# Patient Record
Sex: FEMALE | Race: WHITE | NOT HISPANIC OR LATINO | Employment: FULL TIME
[De-identification: names, ages, dates, MRNs, and addresses within clinical notes are randomized per-mention and may not be internally consistent; named-entity substitution may affect disease eponyms.]

---

## 2020-01-16 ENCOUNTER — TRANSCRIBE ORDERS (OUTPATIENT)
Dept: OTHER | Age: 25
End: 2020-01-16

## 2020-01-16 DIAGNOSIS — E05.00 GRAVES DISEASE: Primary | ICD-10-CM

## 2020-03-03 ENCOUNTER — OFFICE VISIT (OUTPATIENT)
Dept: OPHTHALMOLOGY | Facility: CLINIC | Age: 25
End: 2020-03-03
Attending: OPHTHALMOLOGY
Payer: COMMERCIAL

## 2020-03-03 DIAGNOSIS — E05.00 GRAVES DISEASE: ICD-10-CM

## 2020-03-03 DIAGNOSIS — H02.539 EYELID RETRACTION OR LAG: ICD-10-CM

## 2020-03-03 DIAGNOSIS — H05.20 PROPTOSIS: Primary | ICD-10-CM

## 2020-03-03 DIAGNOSIS — H05.20 PROPTOSIS: ICD-10-CM

## 2020-03-03 PROCEDURE — G0463 HOSPITAL OUTPT CLINIC VISIT: HCPCS | Mod: 25,ZF

## 2020-03-03 PROCEDURE — 92285 EXTERNAL OCULAR PHOTOGRAPHY: CPT | Mod: ZF | Performed by: OPHTHALMOLOGY

## 2020-03-03 RX ORDER — METOPROLOL TARTRATE 50 MG
75 TABLET ORAL 2 TIMES DAILY
COMMUNITY
Start: 2020-01-06 | End: 2020-09-01

## 2020-03-03 ASSESSMENT — SLIT LAMP EXAM - LIDS
COMMENTS: UPPER LID RETRACTION
COMMENTS: NORMAL

## 2020-03-03 ASSESSMENT — MARGIN REFLEX DISTANCE
OD_MRD2: 5
OS_MRD2: 5
OD_MRD1: 6
OS_MRD1: 4

## 2020-03-03 ASSESSMENT — TONOMETRY
IOP_METHOD: ICARE
OD_IOP_MMHG: 17
OS_IOP_MMHG: 16

## 2020-03-03 ASSESSMENT — LAGOPHTHALMOS
OS_LAGOPHTHALMOS: 0
OD_LAGOPHTHALMOS: 0

## 2020-03-03 ASSESSMENT — VISUAL ACUITY
OS_SC+: -1
OS_SC: 20/20
METHOD: SNELLEN - LINEAR
OD_SC: 20/25

## 2020-03-03 ASSESSMENT — CUP TO DISC RATIO
OS_RATIO: 0.3
OD_RATIO: 0.25

## 2020-03-03 ASSESSMENT — EXTERNAL EXAM - LEFT EYE: OS_EXAM: NORMAL

## 2020-03-03 ASSESSMENT — CONF VISUAL FIELD
OD_NORMAL: 1
METHOD: COUNTING FINGERS
OS_NORMAL: 1

## 2020-03-03 ASSESSMENT — EXTERNAL EXAM - RIGHT EYE: OD_EXAM: UPPER LID RETRACTION

## 2020-03-03 NOTE — PROGRESS NOTES
HPI: Maia Woods is a 24 year old F who presents for initial thyroid eye disease evaluation. Referred by Dr. Jones of endocrinology. Started methimazole in December and has slowly increased dosages, now taking 5 mg 6x daily. No surgery or ARSHAD.    She was initially diagnosed with Graves disease in 11/2019 (tachycardic on Apple Watch, feeling warm). First noticed eye symptoms in 1/2020. Initially thought this was pinkeye related. Works in [employer] and stares at computer screen all day, which is new. Recently got blue light glasses to help with eye strain.     She is a never smoker. She is taking selenium 200 micrograms daily x1mo.    Referring physician: Dr. Jones (endocrinology)    Thyroid history:  Diagnosed when? 11/2019   ARSHAD: N/A  Thyroidectomy: N/A    TSI (date):12.5 (11/2019)      Previous results: N/A    Eye symptoms (since when):   Proptosis (better/worse/same since last visit): Yes - right eye  Diplopia(better/worse/same since last visit): NA  Eyelid retraction(better/worse/same since last visit): Yes - right upper eyelid   Tearing(better/worse/same since last visit): NA  Redness (better/worse/same since last visit): NA  Pain ((better/worse/same since last visit): NA  Pain to move the eyes (better/worse/same since last visit): NA  Blurred vision: Right eye seems to be a little blurrier    Ocular history:   Orbital decompression (date, details): N/A  Strabismus surgery (date, details): N/A  Eyelid surgery (date, details): N/A    Exam:   Kena (base):97     Better/worse same: 20/18 (initial)  Strabismus (better/worse/same): initial  Eyelid retraction (better/worse/same): initial      Maia Woods is a 24 year old female with the following diagnoses:     1. 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  The natural history of thyroid eye disease was discussed. We told the patient that typically LINDSEY will worsen for a  period of time, then improve to some degree, and then stabilize without normalizing.  This process can take somewhere between 1 and 3 years on average.  In the meantime, we recommended seeing the patient in the [facility] every 6 months (sooner if the patient experiences worsening vision in either eye).  Once the patient becomes stable for at least 6 months' time, we discussed that the patient may need restorative surgery or prisms.  Finally, we discussed that correcting the thyroid hormone levels does not ensure that the eyes will normalize but that it could help to some degree.      Maia Woods has mild thyroid eye disease including mild right proptosis and right upper eyelid retraction. She has no diplopia, her alignment is ortho, and she has no limitation of extraocular muscle movements. She has some dryness symptoms, and we discussed starting ATs as needed.     Her was TSI elevated to 12.5 on 11/21/2019. She is planning to have either ARSHAD or thyroidectomy in the near future, once the thyroid levels come into an acceptable range.    Return precautions were discussed. Recommend in thyroid eye disease clinic in 4 months with repeat TSI prior.      I spent a total of 50 minutes face to face with Maia Woods during today's office visit.  Over 50% of this time was spent counseling the patient and/or coordinating care regarding her thyroid eye disease.    Complete documentation of historical and exam elements from today's encounter can be found in the full encounter summary report (not reduplicated in this progress note).  I personally obtained the chief complaint(s) and history of present illness.  I confirmed and edited as necessary the review of systems, past medical/surgical history, family history, social history, and examination findings as documented by others; and I examined the patient myself.  I personally reviewed the relevant tests, images, and reports as documented above.  I  formulated and edited as necessary the assessment and plan and discussed the findings and management plan with the patient and family.  I personally reviewed the ophthalmic test(s) associated with this encounter, agree with the interpretation(s) as documented by the resident/fellow, and have edited the corresponding report(s) as necessary.     MD Meaghan Martinez MD  Oculoplastic Surgery Fellow

## 2020-03-03 NOTE — LETTER
2020    RE: Maia Woods  : 1995  MRN: [id number]    Dear Dr. Junior,    Thank you for referring your patient, Maia Woods, to our multi-disciplinary thyroid eye disease clinic recently.  After a thorough history followed by a neuro-ophthalmology, strabismus, and oculoplastics examination, we came to the following conclusions:     HPI: Maia Woods is a 24 year old F who presents for initial thyroid eye disease evaluation. Referred by Dr. Jones of endocrinology. Started methimazole in December and has slowly increased dosages, now taking 5 mg 6x daily. No surgery or ARSHAD.    She was initially diagnosed with Graves disease in 2019 (tachycardic on Apple Watch, feeling warm). First noticed eye symptoms in 2020. Initially thought this was pinkeye related. Works in [employer] and stares at computer screen all day, which is new. Recently got blue light glasses to help with eye strain.     She is a never smoker. She is taking selenium 200 micrograms daily x1mo.    Referring physician: Dr. Jones (endocrinology)    Thyroid history:  Diagnosed when? 2019   ARSHAD: N/A  Thyroidectomy: N/A    TSI (date):12.5 (2019)      Previous results: N/A    Eye symptoms (since when):   Proptosis (better/worse/same since last visit): Yes - right eye  Diplopia(better/worse/same since last visit): NA  Eyelid retraction(better/worse/same since last visit): Yes - right upper eyelid   Tearing(better/worse/same since last visit): NA  Redness (better/worse/same since last visit): NA  Pain ((better/worse/same since last visit): NA  Pain to move the eyes (better/worse/same since last visit): NA  Blurred vision: Right eye seems to be a little blurrier  Ocular history:   Orbital decompression (date, details): N/A  Strabismus surgery (date, details): N/A  Eyelid surgery (date, details): N/A    Exam:   Kena (base):97     Better/worse same: 20/18 (initial)  Strabismus (better/worse/same): initial  Eyelid retraction  (better/worse/same): initial      Maia Woods is a 24 year old female with the following diagnoses:     1. 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  The natural history of thyroid eye disease was discussed. We told the patient that typically LINDSEY will worsen for a period of time, then improve to some degree, and then stabilize without normalizing.  This process can take somewhere between 1 and 3 years on average.  In the meantime, we recommended seeing the patient in the [facility] every 6 months (sooner if the patient experiences worsening vision in either eye).  Once the patient becomes stable for at least 6 months' time, we discussed that the patient may need restorative surgery or prisms.  Finally, we discussed that correcting the thyroid hormone levels does not ensure that the eyes will normalize but that it could help to some degree.      Maia Woods has mild thyroid eye disease including mild right proptosis and right upper eyelid retraction. She has no diplopia, her alignment is ortho, and she has no limitation of extraocular muscle movements. She has some dryness symptoms, and we discussed starting ATs as needed.     Her was TSI elevated to 12.5 on 11/21/2019. She is planning to have either ARSHAD or thyroidectomy in the near future, once the thyroid levels come into an acceptable range.    Return precautions were discussed. Recommend in thyroid eye disease clinic in 4 months with repeat TSI prior.       Thank you for trusting us with the care of your patient.  For further exam details, please feel free to contact our office for additional records.  If you wish to contact us directly regarding this patient please email us or give our clinic a call to arrange a phone conversation.    Sincerely,    John Paul Zuñiga MD  , Neuro-Ophthalmology and Adult Strabismus  Department of Ophthalmology  and Visual Neurosciences  [email or web address]    Mia Hoskins MD    Oculoplastic and Orbital Surgery   Department of Ophthalmology and Visual Neurosciences  [facility]  [email or web address]

## 2020-03-03 NOTE — NURSING NOTE
"Chief Complaint(s) and History of Present Illness(es)     Thyroid Eye disease Eval               Comments     Referred by Dr. Jones for LINDSEY aiden. Started to notice that her eyes seem more \"heavy\" over the last few weeks, but endocrinologist noted it in January. Denies diplopia or blurred vision. Hasn't used drops. No pain or irritation. Started methimazole in December and has slowly increased dosages, now taking 5 mg 6x daily. Labs recently done 2/17/2020, not sure of results but told was elevated.   Inf: pt/mom                 "

## 2020-05-06 ENCOUNTER — TRANSFERRED RECORDS (OUTPATIENT)
Dept: SURGERY | Facility: CLINIC | Age: 25
End: 2020-05-06

## 2020-05-20 ENCOUNTER — VIRTUAL VISIT (OUTPATIENT)
Dept: SURGERY | Facility: CLINIC | Age: 25
End: 2020-05-20
Payer: COMMERCIAL

## 2020-05-20 ENCOUNTER — PREP FOR PROCEDURE (OUTPATIENT)
Dept: SURGERY | Facility: CLINIC | Age: 25
End: 2020-05-20

## 2020-05-20 VITALS — WEIGHT: 145 LBS | HEIGHT: 67 IN | BODY MASS INDEX: 22.76 KG/M2

## 2020-05-20 DIAGNOSIS — E05.00 GRAVES DISEASE: Primary | ICD-10-CM

## 2020-05-20 PROCEDURE — 99202 OFFICE O/P NEW SF 15 MIN: CPT | Mod: 95 | Performed by: SURGERY

## 2020-05-20 ASSESSMENT — MIFFLIN-ST. JEOR: SCORE: 1430.35

## 2020-05-20 NOTE — PROGRESS NOTES
History of Present Illness  Maia Woods is a 24 year old female who is referred from Dr. Kyra Jones for surgery consultation regarding  Graves Disease.  Maia recently diagnosed with Graves in November 2019.  She denies fatigue, weight changes, heat/cold intolerance, bowel/skin changes or CVS symptoms.  Only symptom is palpitations.  Also has significant eye symptoms.  Mostly bulging.    She reports no neck discomfort.  She denies swallowing difficulty, shortness of breath and hoarseness.    She does not have a family history of thyroid cancer.  She denies a personal history of radiation exposure.    Maia is on thyroid medications.  Thyroid medications include: Methimazole and Metoprolol .  Maia has no prior neck surgery..      Past Medical History:   Diagnosis Date     Hyperthyroidism      Past Surgical History:   Procedure Laterality Date     AS RAD RESEC TONSIL/PILLARS  2005       Smoking History:  has never smoked.    Review Of Systems:    10 point ROS is negative except as stated in the History of the Present Illness     Physical Exam:  There were no vitals taken for this visit.  Well developed, well nourished female in no apparent distress.  HEENT:  Normocephalic, atraumatic.                   Eyes with exophthalmos.  Neck:   Normal appearance, mild central fullness, symmetrical.              Range of motion is normal.              No obvious neck masses.  Respirations:  Unlabored.  Neurologic:  Alert.  Speech is clear, no hoarseness.  Psychologic:  Alert and appropriate range of emotions.    Imaging:  All imaging personally reviewed with Maia   Thyroid scan: 44% uptake    Labs:  Reviewed    Assessment and Plan:    Maia has Graves Disease.   I am recommending her for  total thyroidectomy.  Maia is aware of the risks to the recurrent laryngeal nerve and to the parathyroid glands during surgery.  She is interested in proceeding with surgery sometime in the next several weeks.    Mimi PRETTY  MD Isac  Please route to : Primary Care Provider (PCP) and Referring Provider  or send dictated letter to:  Referring Provider    23 minutes spent with the patient, over 50% as counseling.    Video-Visit Details     Type of service:  Video Visit     Video Start Time: 12:52 PM  Video End Time: 1:15 PM    Originating Location (pt. Location): Home     Distant Location (provider location):  [facility]      Platform used for Video Visit: Duncan Chau MD

## 2020-05-20 NOTE — PROGRESS NOTES
"Maia Woods is a 24 year old female who is being evaluated via a billable video visit.      The patient has been notified of following:     \"This video visit will be conducted via a call between you and your physician/provider. We have found that certain health care needs can be provided without the need for an in-person physical exam.  This service lets us provide the care you need with a video conversation.  If a prescription is necessary we can send it directly to your pharmacy.  If lab work is needed we can place an order for that and you can then stop by our lab to have the test done at a later time.    Video visits are billed at different rates depending on your insurance coverage.  Please reach out to your insurance provider with any questions.    If during the course of the call the physician/provider feels a video visit is not appropriate, you will not be charged for this service.\"    Patient has given verbal consent for Video visit? Yes      Patient would like the video invitation sent by: Send to e-mail at: [email or web address]    Will anyone else be joining your video visit? mother            "

## 2020-05-21 ENCOUNTER — TELEPHONE (OUTPATIENT)
Dept: SURGERY | Facility: CLINIC | Age: 25
End: 2020-05-21

## 2020-05-21 DIAGNOSIS — Z11.59 ENCOUNTER FOR SCREENING FOR OTHER VIRAL DISEASES: Primary | ICD-10-CM

## 2020-05-21 PROBLEM — E05.00 GRAVES DISEASE: Status: ACTIVE | Noted: 2020-05-21

## 2020-05-21 NOTE — TELEPHONE ENCOUNTER
Type of surgery:  TOTAL THYROIDECTOMY    Location of surgery: [facility]  Date and time of surgery: 6/22/2020 @ 9:40 AM   Surgeon: RAY NARAYANAN MD   Pre-Op Appt Date: PATIENT TO SCHEDULE    Post-Op Appt Date: PATIENT TO SCHEDULE     Packet sent out: Yes  Pre-cert/Authorization completed:  Not Applicable  Date: 5/21/2020       --OUTPATIENT OVERNIGHT-- TOTAL THYROIDECTOMY    GENERAL PT INST TO HAVE H&P WITH DR PHELAN 150 MIN REQ PA ASSIST NLG NMS

## 2020-05-27 RX ORDER — IODINE SOLUTION STRONG 5% (LUGOL'S) 5 %
SOLUTION ORAL
Qty: 15 ML | Refills: 0 | Status: ON HOLD | OUTPATIENT
Start: 2020-05-27 | End: 2020-06-23

## 2020-05-28 NOTE — PROGRESS NOTES
Service Date: 2020      May 20, 2020         Kyra Jones MD   [facility] [address]   [place] [address]      Re:  Maia Woods,  1995         Dear Kyra:      Thank you for asking me to see Maia Woods regarding her Graves' disease.  This pleasant 24-year-old female was seen via video consult with her mother for her recently diagnosed Graves' disease.  Her diagnosis was in 2019, based on a high apical pulse rate identified with her new Apple watch.  She at that time had no other symptoms.  Her only symptom currently is occasional palpitations.  She also has significant eye symptoms, which is mostly the appearance of her exophthalmos. She denies tearing, redness, irritation, dryness, double vision, etc.  She reports no neck discomfort, swallowing difficulty, shortness of breath, or hoarseness.  There is no family history of thyroid cancer, and no personal history of radiation exposure.  Currently, Maia is on methimazole and metoprolol.  She is requiring increasing doses as her Graves' disease is still not under adequate control.  There is a planned recheck of her labs values after her most recent dose increase and prior to surgery.  Maia has no prior neck surgery.      Her exam is remarkable for the presence of exophthalmos noted.  Her neck reveals an excellent range of motion, some mild fullness in the central neck.  No asymmetry or obvious masses or nodularity noted.  Her imaging is reviewed and simply is a thyroid scan with a 44% uptake.  She also has numerous lab values which were reviewed.      Maia has Graves' disease and has been a challenge for medical management.  She requires escalating doses of her medication and still has not obtained a euthyroid state.  She also has a complication of exophthalmos and is for that reason a poor candidate for radioactive iodine.  I would recommend her for total thyroidectomy.  She is aware of  the risks to the recurrent laryngeal nerve and parathyroid glands during surgery.  She is anxious to proceed sometime in the next several weeks.  We have her scheduled on , so that she can have her repeat lab work done prior to proceeding.  She is given a prescription for Lugol's solution, which I would like her to take for the 5 days prior to her surgery.      Thank you for the referral of this delightful patient.  I did offer to meet with her prior to surgery if that was something she desired, but she is comfortable with our video visit with proceeding and we will meet on the day of her surgery.      Thank you for the referral of this delightful young woman.            RAY NARAYANAN MD             D: 2020   T: 2020   MT: WYATT      Name:     PARESH COOPER   MRN:      [id number]        Account:      [id number]   :      1995           Service Date: 2020      Document: [id number]

## 2020-05-29 DIAGNOSIS — E05.00 GRAVES DISEASE: Primary | ICD-10-CM

## 2020-05-29 RX ORDER — IODINE SOLUTION STRONG 5% (LUGOL'S) 5 %
SOLUTION ORAL
Qty: 15 ML | Refills: 0 | Status: ON HOLD | OUTPATIENT
Start: 2020-05-29 | End: 2020-06-22

## 2020-06-02 ENCOUNTER — OFFICE VISIT (OUTPATIENT)
Dept: OPHTHALMOLOGY | Facility: CLINIC | Age: 25
End: 2020-06-02
Attending: OPHTHALMOLOGY
Payer: COMMERCIAL

## 2020-06-02 DIAGNOSIS — E05.00 PROPTOSIS DUE TO THYROID DISORDER: ICD-10-CM

## 2020-06-02 DIAGNOSIS — E05.00 GRAVES DISEASE: Primary | ICD-10-CM

## 2020-06-02 DIAGNOSIS — H02.539 EYELID RETRACTION OR LAG: ICD-10-CM

## 2020-06-02 PROCEDURE — G0463 HOSPITAL OUTPT CLINIC VISIT: HCPCS | Mod: ZF

## 2020-06-02 ASSESSMENT — TONOMETRY
IOP_METHOD: ICARE
OS_IOP_MMHG: 19
OS_IOP_MMHG: 20
OD_IOP_MMHG: 19
OD_IOP_MMHG: 21
IOP_METHOD: ICARE

## 2020-06-02 ASSESSMENT — SLIT LAMP EXAM - LIDS
COMMENTS: NORMAL
COMMENTS: UPPER LID RETRACTION

## 2020-06-02 ASSESSMENT — CUP TO DISC RATIO
OD_RATIO: 0.25
OS_RATIO: 0.3

## 2020-06-02 ASSESSMENT — VISUAL ACUITY
METHOD: SNELLEN - LINEAR
OD_SC: 20/25
OD_SC+: -3
OS_SC+: -1
OS_SC: 20/20

## 2020-06-02 ASSESSMENT — CONF VISUAL FIELD
METHOD: COUNTING FINGERS
OS_NORMAL: 1
OD_NORMAL: 1

## 2020-06-02 ASSESSMENT — EXTERNAL EXAM - LEFT EYE: OS_EXAM: NORMAL

## 2020-06-02 ASSESSMENT — EXTERNAL EXAM - RIGHT EYE: OD_EXAM: UPPER LID RETRACTION

## 2020-06-02 NOTE — PROGRESS NOTES
HPI:    Maia Woods is a 24 year old F who presents for follow up of thyroid eye disease. Stable subjectively since last visit. She is a never smoker. She is taking selenium 200 micrograms daily.     Referring physician: Dr. Jones (endocrinology)    Thyroid history:  Diagnosed when? 11/2019   ARSHAD: N/A  Thyroidectomy: N/A    TSI (date):12.5 (11/2019)      Previous results: N/A    Eye symptoms (since when):   Proptosis (better/worse/same since last visit): Same - right eye  Diplopia(better/worse/same since last visit): NA  Eyelid retraction(better/worse/same since last visit): Yes - right upper eyelid   Tearing(better/worse/same since last visit): NA  Redness (better/worse/same since last visit): NA  Pain ((better/worse/same since last visit): NA  Pain to move the eyes (better/worse/same since last visit): NA  Blurred vision: Right eye seems to be a little blurrier    Ocular history:   Orbital decompression (date, details): N/A  Strabismus surgery (date, details): N/A  Eyelid surgery (date, details): N/A    Exam:   Kena (base):97   Better/worse same: 20/19 Same (20/18)  Strabismus (better/worse/same): initial  Eyelid retraction (better/worse/same): initial    Maia Woods is a 24 year old female with the following diagnoses:   1. Graves disease       Maia Woods has mild thyroid eye disease including mild right proptosis and right upper eyelid retraction. She has no diplopia, her alignment is ortho, and she has no limitation of extraocular muscle movements. She has some dryness symptoms, and we discussed starting ATs as needed.     She is planning to have thyroidectomy 6/22/2020.    Return precautions were discussed. Recommend in thyroid eye disease clinic in 4 months with repeat TSI prior.      Discussed with patient that we could consider Tepezza IF thyroid eye disease worsens.       Complete documentation of historical and exam elements from today's encounter can be found in the full encounter  summary report (not reduplicated in this progress note).  I personally obtained the chief complaint(s) and history of present illness.  I confirmed and edited as necessary the review of systems, past medical/surgical history, family history, social history, and examination findings as documented by others; and I examined the patient myself.  I personally reviewed the relevant tests, images, and reports as documented above.  I formulated and edited as necessary the assessment and plan and discussed the findings and management plan with the patient and family.  I personally reviewed the ophthalmic test(s) associated with this encounter, agree with the interpretation(s) as documented by the resident/fellow, and have edited the corresponding report(s) as necessary.     MD Jayy Martinez MD  Ophthalmology, PGY-5  Neuro-Ophthalmology Fellow

## 2020-06-17 RX ORDER — ACETAMINOPHEN 500 MG
500-1000 TABLET ORAL EVERY 6 HOURS PRN
COMMUNITY

## 2020-06-19 DIAGNOSIS — Z11.59 ENCOUNTER FOR SCREENING FOR OTHER VIRAL DISEASES: ICD-10-CM

## 2020-06-19 PROCEDURE — 99000 SPECIMEN HANDLING OFFICE-LAB: CPT | Performed by: SURGERY

## 2020-06-19 PROCEDURE — U0003 INFECTIOUS AGENT DETECTION BY NUCLEIC ACID (DNA OR RNA); SEVERE ACUTE RESPIRATORY SYNDROME CORONAVIRUS 2 (SARS-COV-2) (CORONAVIRUS DISEASE [COVID-19]), AMPLIFIED PROBE TECHNIQUE, MAKING USE OF HIGH THROUGHPUT TECHNOLOGIES AS DESCRIBED BY CMS-2020-01-R: HCPCS | Mod: 90 | Performed by: SURGERY

## 2020-06-20 LAB
SARS-COV-2 RNA SPEC QL NAA+PROBE: NOT DETECTED
SPECIMEN SOURCE: NORMAL

## 2020-06-22 ENCOUNTER — ANESTHESIA (OUTPATIENT)
Dept: SURGERY | Facility: CLINIC | Age: 25
End: 2020-06-22
Payer: COMMERCIAL

## 2020-06-22 ENCOUNTER — HOSPITAL ENCOUNTER (OUTPATIENT)
Facility: CLINIC | Age: 25
Discharge: HOME | End: 2020-06-23
Attending: SURGERY | Admitting: SURGERY
Payer: COMMERCIAL

## 2020-06-22 ENCOUNTER — ANESTHESIA EVENT (OUTPATIENT)
Dept: SURGERY | Facility: CLINIC | Age: 25
End: 2020-06-22
Payer: COMMERCIAL

## 2020-06-22 ENCOUNTER — SURGERY (OUTPATIENT)
Age: 25
End: 2020-06-22
Payer: COMMERCIAL

## 2020-06-22 ENCOUNTER — OFFICE VISIT (OUTPATIENT)
Dept: SURGERY | Facility: PHYSICIAN GROUP | Age: 25
End: 2020-06-22
Payer: COMMERCIAL

## 2020-06-22 DIAGNOSIS — Z98.890 S/P THYROID SURGERY: Primary | ICD-10-CM

## 2020-06-22 DIAGNOSIS — E05.00 GRAVES DISEASE: ICD-10-CM

## 2020-06-22 DIAGNOSIS — Z53.9 ERRONEOUS ENCOUNTER--DISREGARD: Primary | ICD-10-CM

## 2020-06-22 LAB
CALCIUM SERPL-MCNC: 8.3 MG/DL (ref 8.5–10.1)
HCG UR QL: NEGATIVE

## 2020-06-22 PROCEDURE — 40000306 ZZH STATISTIC PRE PROC ASSESS II: Performed by: SURGERY

## 2020-06-22 PROCEDURE — 25000132 ZZH RX MED GY IP 250 OP 250 PS 637: Performed by: SURGERY

## 2020-06-22 PROCEDURE — 60240 REMOVAL OF THYROID: CPT | Performed by: SURGERY

## 2020-06-22 PROCEDURE — 37000008 ZZH ANESTHESIA TECHNICAL FEE, 1ST 30 MIN: Performed by: SURGERY

## 2020-06-22 PROCEDURE — 25000125 ZZHC RX 250: Performed by: SURGERY

## 2020-06-22 PROCEDURE — 36415 COLL VENOUS BLD VENIPUNCTURE: CPT | Performed by: SURGERY

## 2020-06-22 PROCEDURE — 36000093 ZZH SURGERY LEVEL 4 1ST 30 MIN: Performed by: SURGERY

## 2020-06-22 PROCEDURE — 60240 REMOVAL OF THYROID: CPT | Mod: AS | Performed by: PHYSICIAN ASSISTANT

## 2020-06-22 PROCEDURE — 25000128 H RX IP 250 OP 636: Performed by: PHYSICIAN ASSISTANT

## 2020-06-22 PROCEDURE — 88307 TISSUE EXAM BY PATHOLOGIST: CPT | Performed by: SURGERY

## 2020-06-22 PROCEDURE — 88307 TISSUE EXAM BY PATHOLOGIST: CPT | Mod: 26 | Performed by: SURGERY

## 2020-06-22 PROCEDURE — 82310 ASSAY OF CALCIUM: CPT | Performed by: SURGERY

## 2020-06-22 PROCEDURE — 37000009 ZZH ANESTHESIA TECHNICAL FEE, EACH ADDTL 15 MIN: Performed by: SURGERY

## 2020-06-22 PROCEDURE — 25000128 H RX IP 250 OP 636: Performed by: ANESTHESIOLOGY

## 2020-06-22 PROCEDURE — 71000012 ZZH RECOVERY PHASE 1 LEVEL 1 FIRST HR: Performed by: SURGERY

## 2020-06-22 PROCEDURE — 25800030 ZZH RX IP 258 OP 636: Performed by: ANESTHESIOLOGY

## 2020-06-22 PROCEDURE — 25000125 ZZHC RX 250: Performed by: NURSE ANESTHETIST, CERTIFIED REGISTERED

## 2020-06-22 PROCEDURE — 27210794 ZZH OR GENERAL SUPPLY STERILE: Performed by: SURGERY

## 2020-06-22 PROCEDURE — 25000128 H RX IP 250 OP 636: Performed by: SURGERY

## 2020-06-22 PROCEDURE — 36000063 ZZH SURGERY LEVEL 4 EA 15 ADDTL MIN: Performed by: SURGERY

## 2020-06-22 PROCEDURE — 81025 URINE PREGNANCY TEST: CPT | Performed by: ANESTHESIOLOGY

## 2020-06-22 PROCEDURE — 25000128 H RX IP 250 OP 636: Performed by: NURSE ANESTHETIST, CERTIFIED REGISTERED

## 2020-06-22 RX ORDER — ONDANSETRON 4 MG/1
4 TABLET, ORALLY DISINTEGRATING ORAL EVERY 30 MIN PRN
Status: DISCONTINUED | OUTPATIENT
Start: 2020-06-22 | End: 2020-06-22 | Stop reason: HOSPADM

## 2020-06-22 RX ORDER — HYDROMORPHONE HYDROCHLORIDE 1 MG/ML
0.2 INJECTION, SOLUTION INTRAMUSCULAR; INTRAVENOUS; SUBCUTANEOUS
Status: DISCONTINUED | OUTPATIENT
Start: 2020-06-22 | End: 2020-06-23 | Stop reason: HOSPADM

## 2020-06-22 RX ORDER — CEFAZOLIN SODIUM 2 G/100ML
2 INJECTION, SOLUTION INTRAVENOUS
Status: COMPLETED | OUTPATIENT
Start: 2020-06-22 | End: 2020-06-22

## 2020-06-22 RX ORDER — ONDANSETRON 2 MG/ML
4 INJECTION INTRAMUSCULAR; INTRAVENOUS EVERY 30 MIN PRN
Status: DISCONTINUED | OUTPATIENT
Start: 2020-06-22 | End: 2020-06-22 | Stop reason: HOSPADM

## 2020-06-22 RX ORDER — FENTANYL CITRATE 50 UG/ML
INJECTION, SOLUTION INTRAMUSCULAR; INTRAVENOUS PRN
Status: DISCONTINUED | OUTPATIENT
Start: 2020-06-22 | End: 2020-06-22

## 2020-06-22 RX ORDER — SODIUM CHLORIDE, SODIUM LACTATE, POTASSIUM CHLORIDE, CALCIUM CHLORIDE 600; 310; 30; 20 MG/100ML; MG/100ML; MG/100ML; MG/100ML
INJECTION, SOLUTION INTRAVENOUS CONTINUOUS
Status: DISCONTINUED | OUTPATIENT
Start: 2020-06-22 | End: 2020-06-22 | Stop reason: HOSPADM

## 2020-06-22 RX ORDER — NALOXONE HYDROCHLORIDE 0.4 MG/ML
.1-.4 INJECTION, SOLUTION INTRAMUSCULAR; INTRAVENOUS; SUBCUTANEOUS
Status: DISCONTINUED | OUTPATIENT
Start: 2020-06-22 | End: 2020-06-23 | Stop reason: HOSPADM

## 2020-06-22 RX ORDER — DEXAMETHASONE SODIUM PHOSPHATE 4 MG/ML
INJECTION, SOLUTION INTRA-ARTICULAR; INTRALESIONAL; INTRAMUSCULAR; INTRAVENOUS; SOFT TISSUE PRN
Status: DISCONTINUED | OUTPATIENT
Start: 2020-06-22 | End: 2020-06-22

## 2020-06-22 RX ORDER — PROPOFOL 10 MG/ML
INJECTION, EMULSION INTRAVENOUS PRN
Status: DISCONTINUED | OUTPATIENT
Start: 2020-06-22 | End: 2020-06-22

## 2020-06-22 RX ORDER — ACETAMINOPHEN 325 MG/1
650 TABLET ORAL EVERY 6 HOURS PRN
Status: DISCONTINUED | OUTPATIENT
Start: 2020-06-22 | End: 2020-06-23 | Stop reason: HOSPADM

## 2020-06-22 RX ORDER — NORGESTIMATE AND ETHINYL ESTRADIOL 7DAYSX3 LO
1 KIT ORAL DAILY
Status: DISCONTINUED | OUTPATIENT
Start: 2020-06-22 | End: 2020-06-23 | Stop reason: HOSPADM

## 2020-06-22 RX ORDER — DROSPIRENONE AND ETHINYL ESTRADIOL 0.02-3(28)
1 KIT ORAL DAILY
COMMUNITY
End: 2020-12-15

## 2020-06-22 RX ORDER — LABETALOL 20 MG/4 ML (5 MG/ML) INTRAVENOUS SYRINGE
10
Status: DISCONTINUED | OUTPATIENT
Start: 2020-06-22 | End: 2020-06-22 | Stop reason: HOSPADM

## 2020-06-22 RX ORDER — FENTANYL CITRATE 50 UG/ML
25-50 INJECTION, SOLUTION INTRAMUSCULAR; INTRAVENOUS
Status: DISCONTINUED | OUTPATIENT
Start: 2020-06-22 | End: 2020-06-22 | Stop reason: HOSPADM

## 2020-06-22 RX ORDER — MAGNESIUM HYDROXIDE 1200 MG/15ML
LIQUID ORAL PRN
Status: DISCONTINUED | OUTPATIENT
Start: 2020-06-22 | End: 2020-06-22 | Stop reason: HOSPADM

## 2020-06-22 RX ORDER — HYDROCODONE BITARTRATE AND ACETAMINOPHEN 5; 325 MG/1; MG/1
1-2 TABLET ORAL EVERY 4 HOURS PRN
Status: DISCONTINUED | OUTPATIENT
Start: 2020-06-22 | End: 2020-06-23 | Stop reason: HOSPADM

## 2020-06-22 RX ORDER — GLYCOPYRROLATE 0.2 MG/ML
INJECTION, SOLUTION INTRAMUSCULAR; INTRAVENOUS PRN
Status: DISCONTINUED | OUTPATIENT
Start: 2020-06-22 | End: 2020-06-22

## 2020-06-22 RX ORDER — ONDANSETRON 4 MG/1
4 TABLET, ORALLY DISINTEGRATING ORAL EVERY 6 HOURS PRN
Status: DISCONTINUED | OUTPATIENT
Start: 2020-06-22 | End: 2020-06-23 | Stop reason: HOSPADM

## 2020-06-22 RX ORDER — CEFAZOLIN SODIUM 1 G/3ML
1 INJECTION, POWDER, FOR SOLUTION INTRAMUSCULAR; INTRAVENOUS SEE ADMIN INSTRUCTIONS
Status: DISCONTINUED | OUTPATIENT
Start: 2020-06-22 | End: 2020-06-22 | Stop reason: HOSPADM

## 2020-06-22 RX ORDER — LIDOCAINE 40 MG/G
CREAM TOPICAL
Status: DISCONTINUED | OUTPATIENT
Start: 2020-06-22 | End: 2020-06-23 | Stop reason: HOSPADM

## 2020-06-22 RX ORDER — LIDOCAINE 40 MG/G
CREAM TOPICAL
Status: DISCONTINUED | OUTPATIENT
Start: 2020-06-22 | End: 2020-06-22 | Stop reason: HOSPADM

## 2020-06-22 RX ORDER — LIDOCAINE HYDROCHLORIDE 10 MG/ML
INJECTION, SOLUTION INFILTRATION; PERINEURAL PRN
Status: DISCONTINUED | OUTPATIENT
Start: 2020-06-22 | End: 2020-06-22

## 2020-06-22 RX ORDER — NEOSTIGMINE METHYLSULFATE 1 MG/ML
VIAL (ML) INJECTION PRN
Status: DISCONTINUED | OUTPATIENT
Start: 2020-06-22 | End: 2020-06-22

## 2020-06-22 RX ORDER — NALOXONE HYDROCHLORIDE 0.4 MG/ML
.1-.4 INJECTION, SOLUTION INTRAMUSCULAR; INTRAVENOUS; SUBCUTANEOUS
Status: DISCONTINUED | OUTPATIENT
Start: 2020-06-22 | End: 2020-06-22

## 2020-06-22 RX ORDER — BUPIVACAINE HYDROCHLORIDE 2.5 MG/ML
INJECTION, SOLUTION EPIDURAL; INFILTRATION; INTRACAUDAL PRN
Status: DISCONTINUED | OUTPATIENT
Start: 2020-06-22 | End: 2020-06-22 | Stop reason: HOSPADM

## 2020-06-22 RX ORDER — PROCHLORPERAZINE MALEATE 5 MG
10 TABLET ORAL EVERY 6 HOURS PRN
Status: DISCONTINUED | OUTPATIENT
Start: 2020-06-22 | End: 2020-06-23 | Stop reason: HOSPADM

## 2020-06-22 RX ORDER — ONDANSETRON 2 MG/ML
4 INJECTION INTRAMUSCULAR; INTRAVENOUS EVERY 6 HOURS PRN
Status: DISCONTINUED | OUTPATIENT
Start: 2020-06-22 | End: 2020-06-23 | Stop reason: HOSPADM

## 2020-06-22 RX ORDER — CALCIUM CARBONATE 1250 MG/5ML
2500 SUSPENSION ORAL EVERY 8 HOURS
Status: DISCONTINUED | OUTPATIENT
Start: 2020-06-22 | End: 2020-06-23 | Stop reason: HOSPADM

## 2020-06-22 RX ORDER — SODIUM CHLORIDE, SODIUM LACTATE, POTASSIUM CHLORIDE, CALCIUM CHLORIDE 600; 310; 30; 20 MG/100ML; MG/100ML; MG/100ML; MG/100ML
INJECTION, SOLUTION INTRAVENOUS CONTINUOUS
Status: DISCONTINUED | OUTPATIENT
Start: 2020-06-22 | End: 2020-06-23 | Stop reason: HOSPADM

## 2020-06-22 RX ORDER — LEVOTHYROXINE SODIUM 112 UG/1
112 TABLET ORAL DAILY
Status: DISCONTINUED | OUTPATIENT
Start: 2020-06-23 | End: 2020-06-23 | Stop reason: HOSPADM

## 2020-06-22 RX ORDER — ONDANSETRON 2 MG/ML
INJECTION INTRAMUSCULAR; INTRAVENOUS PRN
Status: DISCONTINUED | OUTPATIENT
Start: 2020-06-22 | End: 2020-06-22

## 2020-06-22 RX ADMIN — HYDROMORPHONE HYDROCHLORIDE 0.5 MG: 1 INJECTION, SOLUTION INTRAMUSCULAR; INTRAVENOUS; SUBCUTANEOUS at 11:31

## 2020-06-22 RX ADMIN — PROPOFOL 150 MG: 10 INJECTION, EMULSION INTRAVENOUS at 10:23

## 2020-06-22 RX ADMIN — ROCURONIUM BROMIDE 25 MG: 10 INJECTION INTRAVENOUS at 10:23

## 2020-06-22 RX ADMIN — CALCIUM CARBONATE 2500 MG: 1250 SUSPENSION ORAL at 22:07

## 2020-06-22 RX ADMIN — HYDROCODONE BITARTRATE AND ACETAMINOPHEN 2 TABLET: 5; 325 TABLET ORAL at 16:42

## 2020-06-22 RX ADMIN — CALCIUM CARBONATE 2500 MG: 1250 SUSPENSION ORAL at 15:44

## 2020-06-22 RX ADMIN — FENTANYL CITRATE 150 MCG: 50 INJECTION, SOLUTION INTRAMUSCULAR; INTRAVENOUS at 10:23

## 2020-06-22 RX ADMIN — GLYCOPYRROLATE 0.4 MG: 0.2 INJECTION, SOLUTION INTRAMUSCULAR; INTRAVENOUS at 11:55

## 2020-06-22 RX ADMIN — GLYCOPYRROLATE 0.2 MG: 0.2 INJECTION, SOLUTION INTRAMUSCULAR; INTRAVENOUS at 10:23

## 2020-06-22 RX ADMIN — BUPIVACAINE HYDROCHLORIDE 10 ML: 2.5 INJECTION, SOLUTION EPIDURAL; INFILTRATION; INTRACAUDAL; PERINEURAL at 11:46

## 2020-06-22 RX ADMIN — NORGESTIMATE AND ETHINYL ESTRADIOL 1 TABLET: KIT at 15:44

## 2020-06-22 RX ADMIN — HYDROCODONE BITARTRATE AND ACETAMINOPHEN 200 ML: 500; 5 TABLET ORAL at 11:54

## 2020-06-22 RX ADMIN — ONDANSETRON HYDROCHLORIDE 4 MG: 2 INJECTION, SOLUTION INTRAVENOUS at 11:55

## 2020-06-22 RX ADMIN — FENTANYL CITRATE 100 MCG: 50 INJECTION, SOLUTION INTRAMUSCULAR; INTRAVENOUS at 10:39

## 2020-06-22 RX ADMIN — DEXAMETHASONE SODIUM PHOSPHATE 4 MG: 4 INJECTION, SOLUTION INTRA-ARTICULAR; INTRALESIONAL; INTRAMUSCULAR; INTRAVENOUS; SOFT TISSUE at 10:23

## 2020-06-22 RX ADMIN — SODIUM CHLORIDE, POTASSIUM CHLORIDE, SODIUM LACTATE AND CALCIUM CHLORIDE: 600; 310; 30; 20 INJECTION, SOLUTION INTRAVENOUS at 09:00

## 2020-06-22 RX ADMIN — ONDANSETRON 4 MG: 4 TABLET, ORALLY DISINTEGRATING ORAL at 15:56

## 2020-06-22 RX ADMIN — CEFAZOLIN SODIUM 2 G: 2 INJECTION, SOLUTION INTRAVENOUS at 10:19

## 2020-06-22 RX ADMIN — MIDAZOLAM 2 MG: 1 INJECTION INTRAMUSCULAR; INTRAVENOUS at 10:19

## 2020-06-22 RX ADMIN — PROCHLORPERAZINE EDISYLATE 10 MG: 5 INJECTION INTRAMUSCULAR; INTRAVENOUS at 16:49

## 2020-06-22 RX ADMIN — HYDROMORPHONE HYDROCHLORIDE 0.5 MG: 1 INJECTION, SOLUTION INTRAMUSCULAR; INTRAVENOUS; SUBCUTANEOUS at 13:07

## 2020-06-22 RX ADMIN — LIDOCAINE HYDROCHLORIDE 50 MG: 10 INJECTION, SOLUTION INFILTRATION; PERINEURAL at 10:23

## 2020-06-22 RX ADMIN — HYDROMORPHONE HYDROCHLORIDE 0.5 MG: 1 INJECTION, SOLUTION INTRAMUSCULAR; INTRAVENOUS; SUBCUTANEOUS at 10:53

## 2020-06-22 RX ADMIN — Medication 2 MG: at 11:55

## 2020-06-22 RX ADMIN — SODIUM CHLORIDE, POTASSIUM CHLORIDE, SODIUM LACTATE AND CALCIUM CHLORIDE: 600; 310; 30; 20 INJECTION, SOLUTION INTRAVENOUS at 11:15

## 2020-06-22 ASSESSMENT — MIFFLIN-ST. JEOR
SCORE: 1471.17
SCORE: 1471.17

## 2020-06-22 ASSESSMENT — ENCOUNTER SYMPTOMS: DYSRHYTHMIAS: 0

## 2020-06-22 ASSESSMENT — LIFESTYLE VARIABLES: TOBACCO_USE: 0

## 2020-06-22 NOTE — PROGRESS NOTES
"Pt arrived to unit. Stand and pivot from cart to bed-some nausea with movement, relieved with rest. Pain 2/10- ice applied. ALEXANDRA drain to suction-per report stripped in PACU prior to coming up to unit. Dressing c/d/i. Chávez removed in PACU prior to coming to unit-due to void. On 2L O2- sats 95%. Security called per patient's request to bring up her phone. /81 (BP Location: Left arm)   Pulse 65   Temp 98  F (36.7  C) (Oral)   Resp 14   Ht 1.702 m (5' 7\")   Wt 69.9 kg (154 lb)   LMP 06/02/2020   SpO2 95%   BMI 24.12 kg/m    "

## 2020-06-22 NOTE — PLAN OF CARE
"PRIMARY DIAGNOSIS:  TOTAL THYROIDECTOMY  OUTPATIENT/OBSERVATION GOALS TO BE MET BEFORE DISCHARGE:  1. Stable vital signs Yes  2. Tolerating diet: no, intermittent nausea at this time  3. Pain controlled with oral pain medications:  Yes  4. Positive bowel sounds:  Yes  5. Voiding without difficulty:  due to pass first void after discontinue of foster catheter  6. Able to ambulate:  Yes  7. Provider specific discharge goals met:  No    Discharge Planner Nurse   Safe discharge environment identified: Yes  Barriers to discharge: Yes       Entered by: Karen M. Lesch 06/22/2020 5:36 PM, unless medically cleared    Blood pressure 131/77, pulse 65, temperature 98.3  F (36.8  C), temperature source Oral, resp. rate 15, height 1.702 m (5' 7\"), weight 69.9 kg (154 lb), last menstrual period 06/02/2020, SpO2 96 %.    VSS.  LS clear, adequate sats on 1 liter nasal cannula.  ETCO2  36, IPI  8-10.  Patient c/o of intermittent nausea, with little relief from Zofran.  Compazine IV  Given with later improvement.  Maintenance IV of LR continues at 100 ccs/hour.  Anterior neck dressing dry, intact.  ALEXANDRA draining small amounts of bloody liquid. Ice pack placed for increased comfort.  Norco given for discomfort.  Patient noted later to be resting comfortably.   Patient is due to pass first void since removal of foster catheter.  Plan:  Continue to provide supportive cares.  Pain control.       Please review provider order for any additional goals.   Nurse to notify provider when observation goals have been met and patient is ready for discharge.  "

## 2020-06-22 NOTE — ANESTHESIA POSTPROCEDURE EVALUATION
Patient: Maia Woods    Procedure(s):  TOTAL THYROIDECTOMY    Diagnosis:Graves disease [E05.00]  Diagnosis Additional Information: No value filed.    Anesthesia Type:  General    Note:  Anesthesia Post Evaluation    Patient location during evaluation: PACU  Patient participation: Able to fully participate in evaluation  Level of consciousness: awake  Pain management: adequate  Airway patency: patent  Cardiovascular status: acceptable  Respiratory status: acceptable  Hydration status: acceptable  PONV: none             Last vitals:  Vitals:    06/22/20 1349 06/22/20 1507 06/22/20 1529   BP: 134/81  131/77   Pulse:      Resp: 14 12 15   Temp: 98  F (36.7  C)  98.3  F (36.8  C)   SpO2: 95% 96% 96%         Electronically Signed By: Yrn Dean MD  June 22, 2020  5:51 PM

## 2020-06-22 NOTE — PHARMACY-ADMISSION MEDICATION HISTORY
Medication reconciliation interview completed by pre-admitting nurse Vilma Shukla, reviewed by pharmacy. Changed oral contraceptive formulation based on dispense records. Removed duplicate Lugol's solution.  No further clarifications needed.       Prior to Admission medications    Medication Sig Last Dose Taking? Auth Provider   acetaminophen (TYLENOL) 500 MG tablet Take 500-1,000 mg by mouth every 6 hours as needed for mild pain Past Week at Unknown time Yes Reported, Patient   Aspirin-Acetaminophen-Caffeine (EXCEDRIN PO) Take 1 tablet by mouth daily as needed  Past Month at Unknown time Yes Reported, Patient   drospirenone-ethinyl estradiol (JOSUE) 3-0.02 MG tablet Take 1 tablet by mouth daily 6/21/2020 at Unknown time Yes Unknown, Entered By History   METHIMAZOLE PO 40 mg once daily 6/21/2020 at Unknown time Yes Reported, Patient   metoprolol tartrate (LOPRESSOR) 50 MG tablet Take 75 mg by mouth 2 times daily  6/22/2020 at Unknown time Yes Reported, Patient   selenium 50 MCG TABS tablet Take 50 mcg by mouth daily 6/21/2020 at Unknown time Yes Reported, Patient   strong iodine (LUGOL'S) 5 % solution Take 5 drops by mouth 3 times a day for 5 days before surgery 6/21/2020 at Unknown time Yes Mimi Chau MD

## 2020-06-22 NOTE — ANESTHESIA PREPROCEDURE EVALUATION
Anesthesia Pre-Procedure Evaluation    Patient: Maia Woods   MRN: [id number] : 1995          Preoperative Diagnosis: Graves disease [E05.00]    Procedure(s):  TOTAL THYROIDECTOMY    Past Medical History:   Diagnosis Date     Hyperthyroidism     Grave's disease     IBS (irritable bowel syndrome)      Migraine      Past Surgical History:   Procedure Laterality Date     AS RAD RESEC TONSIL/PILLARS       GYN SURGERY      hymenectomy     wisdom teeth extraction       Anesthesia Evaluation     .             ROS/MED HX    ENT/Pulmonary:      (-) tobacco use, asthma and sleep apnea   Neurologic:  - neg neurologic ROS     Cardiovascular:        (-) hypertension, CAD, CHF, arrhythmias, pulmonary hypertension and dyslipidemia   METS/Exercise Tolerance:     Hematologic:        (-) anemia   Musculoskeletal:        (-) arthritis   GI/Hepatic:     (+) Inflammatory bowel disease,      (-) GERD and other GI/Hepatic   Renal/Genitourinary:      (-) renal disease   Endo:     (+) thyroid problem  hyperthyroidism, .      Psychiatric:  - neg psychiatric ROS       Infectious Disease:  - neg infectious disease ROS       Malignancy:      - no malignancy   Other:    - neg other ROS                      Physical Exam      Airway   Mallampati: II  TM distance: >3 FB  Neck ROM: full    Dental     Cardiovascular   Rhythm and rate: regular and normal  (-) no murmur    Pulmonary    breath sounds clear to auscultation    Other findings: No lab results found.   No lab results found.        No results found for: WBC, HGB, HCT, PLT, CRP, SED, NA, POTASSIUM, CHLORIDE, CO2, BUN, CR, GLC, IRLANDA, PHOS, MAG, ALBUMIN, PROTTOTAL, ALT, AST, GGT, ALKPHOS, BILITOTAL, BILIDIRECT, LIPASE, AMYLASE, HA, PTT, INR, FIBR, TSH, T4, T3, HCG, HCGS, CKTOTAL, CKMB, TROPN    Preop Vitals  BP Readings from Last 3 Encounters:   No data found for BP    Pulse Readings from Last 3 Encounters:   No data found for Pulse      Resp Readings from Last 3 Encounters:  "  No data found for Resp    SpO2 Readings from Last 3 Encounters:   No data found for SpO2      Temp Readings from Last 1 Encounters:   No data found for Temp    Ht Readings from Last 1 Encounters:   06/22/20 1.702 m (5' 7\")      Wt Readings from Last 1 Encounters:   06/22/20 69.9 kg (154 lb)    Estimated body mass index is 24.12 kg/m  as calculated from the following:    Height as of this encounter: 1.702 m (5' 7\").    Weight as of this encounter: 69.9 kg (154 lb).       Anesthesia Plan      History & Physical Review  History and physical reviewed and following examination; no interval change.    ASA Status:  2 .    NPO Status:  > 8 hours    Plan for General with Propofol induction. Maintenance will be Balanced.    PONV prophylaxis:  Ondansetron (or other 5HT-3)  Additional equipment: Videolaryngoscope        Postoperative Care  Postoperative pain management:  IV analgesics and Oral pain medications.      Consents  Anesthetic plan, risks, benefits and alternatives discussed with:  Patient..                 Abilio Padilla MD                    .  "

## 2020-06-22 NOTE — ANESTHESIA CARE TRANSFER NOTE
Patient: Maia Woods    Procedure(s):  TOTAL THYROIDECTOMY    Diagnosis: Graves disease [E05.00]  Diagnosis Additional Information: No value filed.    Anesthesia Type:   General     Note:  Airway :Face Mask  Patient transferred to:PACU  Handoff Report: Identifed the Patient, Identified the Reponsible Provider, Reviewed the pertinent medical history, Discussed the surgical course, Reviewed Intra-OP anesthesia mangement and issues during anesthesia, Set expectations for post-procedure period and Allowed opportunity for questions and acknowledgement of understanding      Vitals: (Last set prior to Anesthesia Care Transfer)    CRNA VITALS  6/22/2020 1146 - 6/22/2020 1224      6/22/2020             Pulse:  94    SpO2:  99 %    Resp Rate (observed):  15                Electronically Signed By: JORGE Avina CRNA  June 22, 2020  12:24 PM

## 2020-06-22 NOTE — OP NOTE
General Surgery Operative Note    PREOPERATIVE DIAGNOSIS:  Graves disease [E05.00]    POSTOPERATIVE DIAGNOSIS:  Graves disease    PROCEDURE:  Total thyroidectomy    ANESTHESIA:  General.    PREOPERATIVE MEDICATIONS:  Ancef 2 gm    SURGEON:  Mimi Chau MD    ASSISTANT:  Chanel Cabrera PA-C   - the physician assistant was medically necessary in providing adequate exposure in the operating field, maintaining hemostasis, cutting suture, clamping and ligating blood vessels, and visualization of the anatomic structures throughout the surgical procedure.    ESTIMATED BLOOD LOSS:  25cc's    INDICATIONS:  Maia Woods is a 24 year old female who has Graves disease.  She has also had symptoms including palpitations and eye symptoms.  She presents for total thyroidectomy.    DESCRIPTION OF PROCEDURE:  The patient was placed supine, head and neck in extension and a bump between the scapulae.  Transverse cervical neck creases were marked in the preinduction area and the one most suitable was utilized for exposure.  Superior and inferior skin flaps raised.  Midline fascia opened and reflected to the left. The thyroid lobe was firm and modestly enlarged.  The upper pole was taken down by double ligation and division.  Middle thyroidal vein and inferior pole veins were ligated and the gland reflected medially.  The superior parathyroid and recurrent laryngeal nerve were identified and preserved.  The posterior dissection was meticulously done to ligate and divide the inferior thyroidal artery and the ligament of Berry.  The inferior parathyroid was not clearly seen but did not appear to be in the specimen..  We then proceeded with the right thyroid lobectomy.  This lobe was very similar to the left.  The upper pole was taken down by double ligation and division.  Middle thyroidal vein and inferior pole veins were ligated and the gland reflected medially.  The superior parathyroid and recurrent laryngeal nerve were  identified and preserved.  The posterior dissection was meticulously done to ligate and divide the inferior thyroidal artery and the ligament of Berry.  The inferior parathyroid was seen and preserved.  Once the remaining attachments were divided, the gland was oriented for pathology and submitted for permanent sections.  The site was inspected for hemostasis, irrigated and closed over a 10 round ALEXANDRA drain using running 3-0 Vicryl for the midline fascia, interrupted for platysma and 4-0 subcuticular Monocryl for skin.  The patient transferred to recovery in good condition.    INTRAOPERATIVE FINDINGS:  1.  Thyroid grossly consistent with Graves  2.  Both recurrent laryngeal nerves seen and preserved, function confirmed by nerve monitor.  3.  Right superior and inferior and left superior parathyroid glands seen and preserved.      Specimens:   ID Type Source Tests Collected by Time Destination   A : Total Thyroid Tissue Thyroid SURGICAL PATHOLOGY EXAM Mimi Chau MD 6/22/2020 11:37 AM        Mimi Chau MD

## 2020-06-22 NOTE — OR NURSING
"This RN called to pharmacy and spoke with José Miguel Pharmacist.  Patient brought 2 loose birthcontrol pills.  Pharmacist states to leave the pills in the patient's possession.  Pills are small pink, round, imprinted with a hexagon and what appears to be \"DS.\"   "

## 2020-06-23 ENCOUNTER — TELEPHONE (OUTPATIENT)
Dept: SURGERY | Facility: CLINIC | Age: 25
End: 2020-06-23

## 2020-06-23 VITALS
RESPIRATION RATE: 16 BRPM | HEART RATE: 98 BPM | DIASTOLIC BLOOD PRESSURE: 77 MMHG | WEIGHT: 154 LBS | OXYGEN SATURATION: 97 % | SYSTOLIC BLOOD PRESSURE: 123 MMHG | TEMPERATURE: 98.3 F | BODY MASS INDEX: 24.17 KG/M2 | HEIGHT: 67 IN

## 2020-06-23 LAB
ANION GAP SERPL CALCULATED.3IONS-SCNC: 7 MMOL/L (ref 3–14)
BUN SERPL-MCNC: 8 MG/DL (ref 7–30)
CALCIUM SERPL-MCNC: 8.5 MG/DL (ref 8.5–10.1)
CHLORIDE SERPL-SCNC: 102 MMOL/L (ref 94–109)
CO2 SERPL-SCNC: 26 MMOL/L (ref 20–32)
COPATH REPORT: NORMAL
CREAT SERPL-MCNC: 0.66 MG/DL (ref 0.52–1.04)
GFR SERPL CREATININE-BSD FRML MDRD: >90 ML/MIN/{1.73_M2}
GLUCOSE SERPL-MCNC: 105 MG/DL (ref 70–99)
POTASSIUM SERPL-SCNC: 4.5 MMOL/L (ref 3.4–5.3)
SODIUM SERPL-SCNC: 135 MMOL/L (ref 133–144)

## 2020-06-23 PROCEDURE — 80048 BASIC METABOLIC PNL TOTAL CA: CPT | Performed by: SURGERY

## 2020-06-23 PROCEDURE — 25000132 ZZH RX MED GY IP 250 OP 250 PS 637: Performed by: SURGERY

## 2020-06-23 PROCEDURE — 36415 COLL VENOUS BLD VENIPUNCTURE: CPT | Performed by: SURGERY

## 2020-06-23 RX ORDER — CALCIUM CARBONATE 1250 MG/5ML
2500 SUSPENSION ORAL EVERY 8 HOURS
Qty: 500 ML | Refills: 0 | Status: SHIPPED | OUTPATIENT
Start: 2020-06-23 | End: 2020-12-15

## 2020-06-23 RX ORDER — LEVOTHYROXINE SODIUM 112 UG/1
112 TABLET ORAL DAILY
Qty: 60 TABLET | Refills: 0 | Status: SHIPPED | OUTPATIENT
Start: 2020-06-24 | End: 2022-03-31

## 2020-06-23 RX ORDER — HYDROCODONE BITARTRATE AND ACETAMINOPHEN 5; 325 MG/1; MG/1
1-2 TABLET ORAL EVERY 4 HOURS PRN
Qty: 8 TABLET | Refills: 0 | Status: SHIPPED | OUTPATIENT
Start: 2020-06-23 | End: 2020-09-01

## 2020-06-23 RX ADMIN — CALCIUM CARBONATE 2500 MG: 1250 SUSPENSION ORAL at 06:18

## 2020-06-23 RX ADMIN — LEVOTHYROXINE SODIUM 112 MCG: 112 TABLET ORAL at 07:39

## 2020-06-23 RX ADMIN — HYDROCODONE BITARTRATE AND ACETAMINOPHEN 2 TABLET: 5; 325 TABLET ORAL at 00:26

## 2020-06-23 RX ADMIN — HYDROCODONE BITARTRATE AND ACETAMINOPHEN 1 TABLET: 5; 325 TABLET ORAL at 06:18

## 2020-06-23 NOTE — PROGRESS NOTES
"[facility]  General Surgery Progress Note           Assessment and Plan:   Assessment:   -Grave's Disease  -POD#1 s/p TOTAL THYROIDECTOMY  -Postop hypocalcemia, asymptomatic; resolved      Plan:   -OK to DC today. DC Rx: norco, levothyroxine, oscal liquid.  OTC bowel program prn.  Discharge instructions were reviewed with the patient in detail.  All her questions/concerns were addressed.  She is aware that a printed copy of instructions will be given to her upon discharge.  -RTC with Dr. Chau in 1-3 weeks.  RTC with Endocrinologist in 4-6 weeks.         Interval History:   Comfortable in bed.  Voice strong.  No nausea.  +headache due to bed/pillow.  Voiding well.  No tingling/numbness.  Discussed pain meds and headache interventions.         Physical Exam:   Blood pressure 102/67, pulse 98, temperature 98.3  F (36.8  C), temperature source Oral, resp. rate 16, height 1.702 m (5' 7\"), weight 69.9 kg (154 lb), last menstrual period 06/02/2020, SpO2 98 %.    I/O last 3 completed shifts:  In: 1500 [I.V.:1500]  Out: 2005 [Urine:1950; Drains:30; Blood:25]    Neck - flat, no seroma/hematoma.  Incision - clean, dry, intact.  No erythema.  Drain - No leakage around site.  Output: pink/light pink & thin.  DC'd without issue.  Chvostek's - negative          Data:     Recent Labs   Lab Test 06/23/20  0549 06/22/20 2058   IRLANDA 8.5 8.3*        Chanel Cabrera PA-C     "

## 2020-06-23 NOTE — PLAN OF CARE
PRIMARY DIAGNOSIS: Thyroidectomy  OUTPATIENT/OBSERVATION GOALS TO BE MET BEFORE DISCHARGE:  1. Stable vital signs Yes  2. Tolerating diet:Yes  3. Pain controlled with oral pain medications:  Yes  4. Positive bowel sounds:  Yes  5. Voiding without difficulty:  Yes  6. Able to ambulate:  Yes  7. Provider specific discharge goals met:  Yes    Discharge Planner Nurse   Safe discharge environment identified: Yes  Barriers to discharge: No       Entered by: Audrey Ambriz 06/23/2020 4:43 AM     Please review provider order for any additional goals.   Nurse to notify provider when observation goals have been met and patient is ready for discharge.    VSS. Tachycardic. 3/10 headache and throat pain. Norco x1. Ice to neck. Refusing capnography overnight. Continuous pulse ox on overnight. Tolerating fulls. Advanced to regular for AM. Pt expresses minimal appetite and some anxiety around advancing diet.. Up independently in room. Voiding without difficulty. ALEXANDRA drain in place. 5 ml bright red output. Will continue to monitor.

## 2020-06-23 NOTE — PLAN OF CARE
PRIMARY DIAGNOSIS: Thyroidectomy  OUTPATIENT/OBSERVATION GOALS TO BE MET BEFORE DISCHARGE:  1. Stable vital signs Yes  2. Tolerating diet:Yes  3. Pain controlled with oral pain medications:  Yes  4. Positive bowel sounds:  Yes  5. Voiding without difficulty:  Yes  6. Able to ambulate:  Yes  7. Provider specific discharge goals met:  Yes    Discharge Planner Nurse   Safe discharge environment identified: Yes  Barriers to discharge: No       Entered by: Audrey Ambriz 06/23/2020 1:18 AM     Please review provider order for any additional goals.   Nurse to notify provider when observation goals have been met and patient is ready for discharge.    VSS. Tachycardic. 3/10 headache and throat pain. Norco x1. Ice to neck. Tolerating fulls. Up independently in room. Voiding without difficulty. Will continue to monitor.

## 2020-06-23 NOTE — PROGRESS NOTES
Patient's After Visit Summary was reviewed with patient   Patient verbalized understanding of After Visit Summary, recommended follow up and was given an opportunity to ask questions.   Discharge medications sent home with patient/family: YES   Discharged with family     OBSERVATION patient END time: 0858

## 2020-06-23 NOTE — PLAN OF CARE
"PRIMARY DIAGNOSIS:  TOTAL THYROIDECTOMY  OUTPATIENT/OBSERVATION GOALS TO BE MET BEFORE DISCHARGE:  1. Stable vital signs Yes  2. Tolerating diet:  Clear liquids, crackers given as nausea has improved, tolerating well  3. Pain controlled with oral pain medications:  Yes  4. Positive bowel sounds:  Yes  5. Voiding without difficulty:  due to pass first void after discontinue of foster catheter  6. Able to ambulate:  Yes  7. Provider specific discharge goals met:  No      Blood pressure 133/89, pulse 65, temperature 97.7  F (36.5  C), temperature source Oral, resp. rate 16, height 1.702 m (5' 7\"), weight 69.9 kg (154 lb), last menstrual period 06/02/2020, SpO2 97 %.    VSS.  LS clear, adequate sats on 1 liter nasal cannula.  ETCO2  36, IPI  10.  Nausea after Zofran and Compazine given.  Patient later tolerating increasing amounts of  amounts of clear liquids and crackers.    Maintenance IV of LR continues at 100 ccs/hour, will discontinue as patient is taking adequate amounts of oral liquids.  Anterior neck dressing dry, intact.  ALEXANDRA draining small amounts of bloody liquid. Ice pack placed for increased comfort.  Norco given for discomfort.  Patient ambulating the unit hallways.  Patient voided 250 ccs of clear tanika urine.  Plan:  Continue to provide supportive cares.      Please review provider order for any additional goals.   Nurse to notify provider when observation goals have been met and patient is ready for discharge.        Discharge Planner Nurse   Safe discharge environment identified: Yes  Barriers to discharge: Yes       Entered by: Karen M. Lesch 06/22/2020 8:15 PM, unless medically cleared    .      "

## 2020-06-23 NOTE — TELEPHONE ENCOUNTER
Name of caller: Maribel [phone]    Reason for Call:  Medication change    Surgeon:  Dr. Chau    Recent Surgery:  Yes.    If yes, when & what type:  6/22/20 total thyroid      Best phone number to reach pt at is: [phone]  Ok to leave a message with medical info? Yes.    Pharmacy preferred (if calling for a refill): na

## 2020-06-23 NOTE — PLAN OF CARE
PRIMARY DIAGNOSIS: Thyroidectomy  OUTPATIENT/OBSERVATION GOALS TO BE MET BEFORE DISCHARGE:  1. Stable vital signs Yes  2. Tolerating diet:Yes  3. Pain controlled with oral pain medications:  Yes  4. Positive bowel sounds:  Yes  5. Voiding without difficulty:  Yes  6. Able to ambulate:  Yes  7. Provider specific discharge goals met:  Yes    Discharge Planner Nurse   Safe discharge environment identified: Yes  Barriers to discharge: No       Entered by: Arcenio Bustos 06/23/2020 7:57 AM     Please review provider order for any additional goals.   Nurse to notify provider when observation goals have been met and patient is ready for discharge.    VSS. 3/10 headache and throat pain. Norco x 1 overnight with no relief. Cold compress to forehead. Refusing capnography overnight. Continuous pulse ox on overnight. Tolerating fulls. Advanced to regular for AM. Up independently in room. Voiding without difficulty last evening and overnight. ALEXANDRA drain in place draining minimal serosanguinous drainage. Pt. Would like to take Excedrin at home and would like to know from provider if she can continue this after discharge.

## 2020-06-23 NOTE — DISCHARGE INSTRUCTIONS
HOME CARE FOLLOWING THYROIDECTOMY/LOBECTOMY  Tolu No, WINDY Domingo    Special instructions for Maia Woods:  --Discharge medications: Calcium supplement, Levothyroxine and Norco.    --STOP methimazole, iodine drops and metoprolol.  --Calcium Taper Schedule:  Take 10 mL every 8 hours for one week, then decrease to 10mL every 12 hours for one week, then decrease to 5mL every 12 hours for one week, then decrease to 5mL daily until gone.  --Follow up appointment with Dr. Chau in 1-3 weeks, follow up with Endocrinologist in 4-6 weeks.     RESULTS:  Call the office regarding your final pathology report if you have not received your results after 2 full business days.     INCISIONAL CARE:    You may remove the dressing 24 hours after the drain was removed; replace the gauze if it becomes soiled.    You may expect a small amount of drainage from your previous drain site.    After removing the dressing, you may shower.  Do not submerse incision in water for 1 week.    Sutures will absorb and do not need to be removed.    Leave the steri-strips (white paper tapes) in place until they fall off, or remove at 2 weeks after surgery.    A lump/ridge under the incision is normal and will gradually resolve.    ACTIVITY:  Light Activity -- you may immediately be up and about as tolerated.  Driving -- you may drive when comfortable and off narcotic pain medications.  Light Work -- resume when comfortable off pain medications.  (If you can drive, you probably can work.)  Strenuous Work/Activity -- limit lifting to less than 10 pounds for 1 week.  Progressively increase with time.  Active Sports (running, biking, etc.) -- cautiously resume after 1 week.    DIET:  No restrictions.  Increased fluid intake is recommended. While taking pain medications, increase dietary fiber or add a fiber supplementation like Metamucil or Citrucel to help prevent constipation - a possible side effect of pain  medications.    DISCOMFORT:  Use pain medications as prescribed by your surgeon.  Take the pain medication with some food, when possible, to minimize side effects.  Intermittent use of ice packs may help during the first 48 hours.  Expect gradual improvement.    CALCIUM SUPPLEMENTATION:  Some patients are prescribed to take a calcium supplement after surgery.  Please take as prescribed.  Watch for symptoms of numbness or tingling around the mouth or in the fingers or toes.  This may be a sign of a low calcium level. If this happens, take an extra dose of your calcium supplement.  If the symptoms persist, please contact the office.  You may need to have your blood calcium level checked by doing a simple blood test.  We may also need to make adjustments in your calcium dose.  **Not all patients require calcium monitoring and supplementation post-op.  If your calcium was monitored and stabilized after surgery, the risk of having issues with low calcium once you are home is very small.    RETURN APPOINTMENT:  Schedule a follow-up visit 1-3 weeks post-op (you may do this any time after surgery is scheduled).  Office Phone:  [phone]       CONTACT US IF THE FOLLOWING DEVELOPS:   1. A fever that is above 101     2. If there is a large amount of drainage, bleeding, or swelling.   3. Severe pain that is not relieved by your prescription.   4. Drainage that is thick, cloudy, yellow, green or white.   5. Any other questions not answered by  Frequently Asked Questions  sheet.      FREQUENTLY ASKED QUESTIONS:    Q:  How should my incision look?    A:  Normally your incision will appear slightly swollen with light redness directly along the incision itself as it heals.  It may feel like a bump or ridge as the healing/scarring happens, and over time (3-4 months) this bump or ridge feeling should slowly go away.  In general, clear or pink watery drainage can be normal at first as your incision heals, but should decrease over  time.    Q:  How do I know if my incision is infected?  A:  Look at your incision for signs of infection, like redness around the incision spreading to surrounding skin, or drainage of cloudy or foul-smelling drainage.  If you feel warm, check your temperature to see if you are running a fever.    **If any of these things occur, please notify the nurse at our office.  We may need you to come into the office for an incision check.      Q:  How do I take care of my incision?  A:  If you have a dressing in place - Starting the day after surgery, replace the dressing 1-2 times a day until there is no further drainage from the incision.  At that time, a dressing is no longer needed.  Try to minimize tape on the skin if irritation is occurring at the tape sites.  If you have significant irritation from tape on the skin, please call the office to discuss other method of dressing your incision.    Small pieces of tape called  steri-strips  may be present directly overlying your incision; these may be removed 10 days after surgery unless otherwise specified by your surgeon.  If these tapes start to loosen at the ends, you may trim them back until they fall off or are removed.      Q:  There is a piece of tape or a sticky  lead  still on my skin.  Can I remove this?  A:  Sometimes the sticky  leads  used for monitoring during surgery or for evaluation in the emergency department are not all removed while you are in the hospital.  These sometimes have a tab or metal dot on them.  You can easily remove these on your own, like taking off a band-aid.  If there is a gel substance under the  lead , simply wipe/clean it off with a washcloth or paper towel.      Q:  What can I do to minimize constipation (very hard stools, or lack of stools)?  A:  Stay well hydrated.  Increase your dietary fiber intake or take a fiber supplement -with plenty of water.  Walk around frequently.  You may consider an over-the-counter stool-softener.   Your Pharmacist can assist you with choosing one that is stocked at your pharmacy.  Constipation is also one of the most common side effects of pain medication.  If you are using pain medication, be pro-active and try to PREVENT problems with constipation by taking the steps above BEFORE constipation becomes a problem.    Q:  What do I do if I need more pain medications?  A:  Call the office to receive refills.  Be aware that certain pain meds cannot be called into a pharmacy and actually require a paper prescription.  A change may be made in your pain med as you progress thru your recovery period or if you have side effects to certain meds.    --Pain meds are NOT refilled after 5pm on weekdays, and NOT AT ALL on the weekends, so please look ahead to prevent problems.      Q:  Why am I having a hard time sleeping now that I am at home?  A:  Many medications you receive while you are in the hospital can impact your sleep for a number of days after your surgery/hospitalization.  Decreased level of activity and naps during the day may also make sleeping at night difficult.  Try to minimize day-time naps, and get up frequently during the day to walk around your home during your recovery time.  Sleep aides may be of some help, but are not recommended for long-term use.      Q:  I am having some back discomfort.  What should I do?  A:  This may be related to certain positioning that was required for your surgery, extended periods of time in bed, or other changes in your overall activity level.  You may try ice, heat, acetaminophen, or ibuprofen to treat this temporarily.  Note that many pain medications have acetaminophen in them and would state this on the prescription bottle.  Be sure not to exceed the maximum of 4000mg per day of acetaminophen.     **If the pain you are having does not resolve, is severe, or is a flare of back pain you have had on other occasions prior to surgery, please contact your primary physician  for further recommendations or for an appointment to be examined at their office.    Q:  Why am I having headaches?  A:  Headaches can be caused by many things:  caffeine withdrawal, use of pain meds, dehydration, high blood pressure, lack of sleep, over-activity/exhaustion, flare-up of usual migraine headaches.  If you feel this is related to muscle tension (a band-like feeling around the head, or a pressure at the low-back of the head) you may try ice or heat to this area.  You may need to drink more fluids (try electrolyte drink like Gatorade), rest, or take your usual migraine medications.   **If your headaches do not resolve, worsen, are accompanied by other symptoms, or if your blood pressure is high, please call your primary physician for recommendation and/or examination.    Q:  I am unable to urinate.  What do I do?  A:  A small percentage of people can have difficulty urinating initially after surgery.  This includes being able to urinate only a very small amount at a time and feeling discomfort or pressure in the very low abdomen.  This is called  urinary retention , and is actually an urgent situation.  Proceed to your nearest Emergency department for evaluation (not an Urgent Care Center).  Sometimes the bladder does not work correctly after certain medications you receive during surgery, or related to certain procedures.  You may need to have a catheter placed until your bladder recovers.  When planning to go to an Emergency department, it may help to call the ER to let them know you are coming in for this problem after a surgery.  This may help you get in quicker to be evaluated.  **If you have symptoms of a urinary tract infection, please contact your primary physician for the proper evaluation and treatment.          If you have other questions, please call the office Monday thru Friday between 8am and 5pm to discuss with the nurse or physician assistant.  #[phone]    There is a surgeon ON  CALL on weekday evenings and over the weekend in case of urgent need only, and may be contacted at the same number.    If you are having an emergency, call 911 or proceed to your nearest emergency department.

## 2020-06-23 NOTE — TELEPHONE ENCOUNTER
Procedure:  Total thyroidectomy    Date:  06/22/2020    Surgeon:  Isac    Patient's mom calling on behalf of patient reporting that it seems patient experiences emesis shortly after she takes the calcium liquid.  Wondering if it comes in pill form.    She does also report that patient currently has a migraine headache, which could be the reason for emesis.  Wondering if patient can take excedrin migraine.  Informed her that this is fine, just watch the amount of acetaminophen patient is getting in a 24 hour period.  Max of 4000 mg.    Also informed her that it could be the narcotic making her sick as well as causing a headache. Encouraged her to take calcium separately from narcotic at next dosing in order to determine which one, if either, is causing the symptoms.    She agreed and they will follow up tomorrow if symptoms are still persisting.    Cait Bustos, IMANI-BSN

## 2020-06-24 ENCOUNTER — TELEPHONE (OUTPATIENT)
Dept: SURGERY | Facility: CLINIC | Age: 25
End: 2020-06-24

## 2020-06-24 NOTE — TELEPHONE ENCOUNTER
Called patient's mother (Cindy) for follow up from yesterday's conversation.    She reports that patient stopped taking the narcotic and has not had any incidents of n/v since then.    Excedrin Migraine took away her headache and patient is feeling much better. She is up moving around and has taken a shower.    She wonders if patient should stop metoprolol or continue taking it?  There is some confusion with discharge instructions.  Informed her to stop taking it as this was advised on discharge instructions directly from our clinic.  Encouraged continued monitoring of heart rate and if increases above normal range, they should call and this will be discussed with Dr. Chau.    Post op appointment also set up during conversation. She reports that she was under the impression from Dr. Chau that it could be a virtual visit.  Scheduled as virtual visit, but will verify with Dr. Chau that this is okay.    Cindy in agreement and they will call PRN.    Cait Bustos RN-BSN

## 2020-08-31 ENCOUNTER — TELEPHONE (OUTPATIENT)
Dept: OPHTHALMOLOGY | Facility: CLINIC | Age: 25
End: 2020-08-31

## 2020-08-31 NOTE — TELEPHONE ENCOUNTER
Confirm the appointment for 9/1/2020. Also advised of clinic changes due to covid-19 (mask policy, visitor restrictions, parking, etc.) Clinic phone number provided for questions.        Trish Bellamy

## 2020-09-01 ENCOUNTER — OFFICE VISIT (OUTPATIENT)
Dept: OPHTHALMOLOGY | Facility: CLINIC | Age: 25
End: 2020-09-01
Attending: OPHTHALMOLOGY
Payer: COMMERCIAL

## 2020-09-01 DIAGNOSIS — E05.00 PROPTOSIS DUE TO THYROID DISORDER: ICD-10-CM

## 2020-09-01 DIAGNOSIS — H02.539 EYELID RETRACTION OR LAG: ICD-10-CM

## 2020-09-01 DIAGNOSIS — E05.00 GRAVES DISEASE: Primary | ICD-10-CM

## 2020-09-01 DIAGNOSIS — E05.00 GRAVES DISEASE: ICD-10-CM

## 2020-09-01 PROCEDURE — G0463 HOSPITAL OUTPT CLINIC VISIT: HCPCS | Mod: ZF

## 2020-09-01 PROCEDURE — 92285 EXTERNAL OCULAR PHOTOGRAPHY: CPT | Mod: ZF | Performed by: OPHTHALMOLOGY

## 2020-09-01 ASSESSMENT — CONF VISUAL FIELD
OS_NORMAL: 1
OD_NORMAL: 1

## 2020-09-01 ASSESSMENT — TONOMETRY
IOP_METHOD: ICARE B/T
OD_IOP_MMHG: 17
OS_IOP_MMHG: 17
OD_IOP_MMHG: 21
OS_IOP_MMHG: 19
IOP_METHOD: ICARE B/S

## 2020-09-01 ASSESSMENT — SLIT LAMP EXAM - LIDS: COMMENTS: UPPER LID RETRACTION

## 2020-09-01 ASSESSMENT — VISUAL ACUITY
OS_SC+: -1
OS_SC: 20/20
OD_SC+: +2
OD_SC: 20/25
METHOD: SNELLEN - LINEAR

## 2020-09-01 ASSESSMENT — MARGIN REFLEX DISTANCE
OS_MRD1: 4
OD_MRD1: 7

## 2020-09-01 ASSESSMENT — EXTERNAL EXAM - LEFT EYE: OS_EXAM: NORMAL

## 2020-09-01 ASSESSMENT — CUP TO DISC RATIO
OD_RATIO: 0.25
OS_RATIO: 0.3

## 2020-09-01 ASSESSMENT — EXTERNAL EXAM - RIGHT EYE: OD_EXAM: UPPER LID RETRACTION

## 2020-09-01 NOTE — PROGRESS NOTES
HPI:    Maia Woods is a 24 year old F who presents for follow up of thyroid eye disease. Stable subjectively since last visit. She is a never smoker. She is taking selenium 200 micrograms daily.  Now s/p total thyroidectomy 6/22/20 and doing well since surgery.  No significant changes in symptoms since then.    Eye symptoms started in January 2020.    Referring physician: Dr. Jones (endocrinology)     Thyroid history:  Diagnosed when? 11/2019   ARSHAD: N/A  Thyroidectomy: 6/22/2020    TSI (date):  12.5 (11/2019)      Previous results: N/A    Eye symptoms (since when):   Proptosis (better/worse/same since last visit): Same - right eye  Diplopia (better/worse/same since last visit): NA  Eyelid retraction(better/worse/same since last visit): Yes - right upper eyelid   Tearing(better/worse/same since last visit): same  Redness (better/worse/same since last visit): same  Pain ((better/worse/same since last visit): NA  Pain to move the eyes (better/worse/same since last visit): same  Blurred vision: Right eye seems to be a little blurrier - same    Ocular history:   Orbital decompression (date, details): N/A  Strabismus surgery (date, details): N/A  Eyelid surgery (date, details): N/A    Exam:   Kena (base):97   Better/worse same: 20/19 Same (20/18)  Strabismus (better/worse/same): initial  Eyelid retraction (better/worse/same): initial    Maia Woods is a 24 year old female with the following diagnoses:   1. Graves disease - Both Eyes    2. Eyelid retraction or lag - Right Eye    3. 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 She has no diplopia, her alignment is ortho, and she has no limitation of extraocular muscle movements. She has some dryness symptoms and is using artificial tears as needed. She is now s/p total thyroidectomy 6/22/2020, healing well, stable symptoms and measurements since  last visit prior to thyroidectomy.  TSH low with normal T4 and T3 done at her endocrinology clinic in July 2020, no TSI was done.    Plan:  - patient stable today.  Retraction may be improved slightly.  Our sense is that she has entered chronic phase.  Patient discussed orbital decompression with Dr. Hoskins today (see below)    Right upper eyelid retraction repair (mullerectomy). Has left upper lid ptosis but elevates when I pull down on the right upper lid. R/b/a discussed including infection, bleeding, injury to the eye would be very rare, need for revision, contour abnormalities. Mia Hoskins MD        Complete documentation of historical and exam elements from today's encounter can be found in the full encounter summary report (not reduplicated in this progress note).  I personally obtained the chief complaint(s) and history of present illness.  I confirmed and edited as necessary the review of systems, past medical/surgical history, family history, social history, and examination findings as documented by others; and I examined the patient myself.  I personally reviewed the relevant tests, images, and reports as documented above.  I formulated and edited as necessary the assessment and plan and discussed the findings and management plan with the patient and family.  I personally reviewed the ophthalmic test(s) associated with this encounter, agree with the interpretation(s) as documented by the resident/fellow, and have edited the corresponding report(s) as necessary.     John Paul Zuñiga MD

## 2020-09-01 NOTE — NURSING NOTE
Chief Complaint(s) and History of Present Illness(es)     Thyroid Disease     Laterality: both eyes    Associated symptoms: dryness and redness.  Negative for double vision and eye pain              Comments     S/p thyroidectomy on 6/22/2020, pt feel that they have healed well and they feel well overall.  Pt is taking Synthroid and Selenium at this time.  Pt has dryness that they take ATs PRN for.  Pt notes no diplopia and feels that their proptosis is stable since their last exam.

## 2020-09-03 ENCOUNTER — TELEPHONE (OUTPATIENT)
Dept: OPHTHALMOLOGY | Facility: CLINIC | Age: 25
End: 2020-09-03

## 2020-09-03 DIAGNOSIS — Z11.59 ENCOUNTER FOR SCREENING FOR OTHER VIRAL DISEASES: Primary | ICD-10-CM

## 2020-09-03 NOTE — TELEPHONE ENCOUNTER
Spoke with patient to schedule surgery with Dr. Mia Hoskins.    Surgery was scheduled on 10/01 at [facility]  Patient will have H&P at [facility]      Patient is aware a COVID-19 test is needed before their procedure. The test should be with-in 4 days of their procedure.   Test Details: Date ** Location**    Post-Op visit was scheduled on 10/15  Patient is aware a / is needed day of surgery.   Surgery packet was mailed, patient has my direct contact information for any further questions.

## 2020-09-03 NOTE — TELEPHONE ENCOUNTER
Called patient to schedule procedure with Dr. Mia Hoskins, there was no answer.  Left message with my direct line [phone].

## 2020-09-28 DIAGNOSIS — Z11.59 ENCOUNTER FOR SCREENING FOR OTHER VIRAL DISEASES: ICD-10-CM

## 2020-09-28 PROCEDURE — U0003 INFECTIOUS AGENT DETECTION BY NUCLEIC ACID (DNA OR RNA); SEVERE ACUTE RESPIRATORY SYNDROME CORONAVIRUS 2 (SARS-COV-2) (CORONAVIRUS DISEASE [COVID-19]), AMPLIFIED PROBE TECHNIQUE, MAKING USE OF HIGH THROUGHPUT TECHNOLOGIES AS DESCRIBED BY CMS-2020-01-R: HCPCS | Performed by: OPHTHALMOLOGY

## 2020-09-29 LAB
SARS-COV-2 RNA SPEC QL NAA+PROBE: NOT DETECTED
SPECIMEN SOURCE: NORMAL

## 2020-09-30 ENCOUNTER — ANESTHESIA EVENT (OUTPATIENT)
Dept: SURGERY | Facility: AMBULATORY SURGERY CENTER | Age: 25
End: 2020-09-30

## 2020-10-01 ENCOUNTER — HOSPITAL ENCOUNTER (OUTPATIENT)
Facility: AMBULATORY SURGERY CENTER | Age: 25
Discharge: HOME | End: 2020-10-01
Attending: OPHTHALMOLOGY | Admitting: OPHTHALMOLOGY
Payer: COMMERCIAL

## 2020-10-01 ENCOUNTER — ANESTHESIA (OUTPATIENT)
Dept: SURGERY | Facility: AMBULATORY SURGERY CENTER | Age: 25
End: 2020-10-01

## 2020-10-01 VITALS
HEART RATE: 79 BPM | DIASTOLIC BLOOD PRESSURE: 82 MMHG | SYSTOLIC BLOOD PRESSURE: 126 MMHG | HEIGHT: 67 IN | TEMPERATURE: 98.2 F | BODY MASS INDEX: 22.91 KG/M2 | RESPIRATION RATE: 16 BRPM | OXYGEN SATURATION: 100 % | WEIGHT: 146 LBS

## 2020-10-01 DIAGNOSIS — E05.00 PROPTOSIS DUE TO THYROID DISORDER: ICD-10-CM

## 2020-10-01 DIAGNOSIS — Z98.890 S/P THYROID SURGERY: Primary | ICD-10-CM

## 2020-10-01 DIAGNOSIS — H02.539 EYELID RETRACTION OR LAG: ICD-10-CM

## 2020-10-01 DIAGNOSIS — E05.00 GRAVES DISEASE: ICD-10-CM

## 2020-10-01 LAB
HCG UR QL: NEGATIVE
INTERNAL QC OK POCT: YES

## 2020-10-01 PROCEDURE — 67911 REVISE EYELID DEFECT: CPT | Mod: E3

## 2020-10-01 RX ORDER — KETOROLAC TROMETHAMINE 30 MG/ML
30 INJECTION, SOLUTION INTRAMUSCULAR; INTRAVENOUS EVERY 6 HOURS PRN
Status: DISCONTINUED | OUTPATIENT
Start: 2020-10-01 | End: 2020-10-02 | Stop reason: HOSPADM

## 2020-10-01 RX ORDER — PROPOFOL 10 MG/ML
INJECTION, EMULSION INTRAVENOUS PRN
Status: DISCONTINUED | OUTPATIENT
Start: 2020-10-01 | End: 2020-10-01

## 2020-10-01 RX ORDER — LIDOCAINE 40 MG/G
CREAM TOPICAL
Status: DISCONTINUED | OUTPATIENT
Start: 2020-10-01 | End: 2020-10-01 | Stop reason: HOSPADM

## 2020-10-01 RX ORDER — FENTANYL CITRATE 50 UG/ML
25-50 INJECTION, SOLUTION INTRAMUSCULAR; INTRAVENOUS
Status: DISCONTINUED | OUTPATIENT
Start: 2020-10-01 | End: 2020-10-02 | Stop reason: HOSPADM

## 2020-10-01 RX ORDER — NEOMYCIN SULFATE, POLYMYXIN B SULFATE AND DEXAMETHASONE 3.5; 10000; 1 MG/ML; [USP'U]/ML; MG/ML
SUSPENSION/ DROPS OPHTHALMIC
Qty: 1 BOTTLE | Refills: 0 | Status: SHIPPED | OUTPATIENT
Start: 2020-10-01 | End: 2020-12-15

## 2020-10-01 RX ORDER — GABAPENTIN 300 MG/1
300 CAPSULE ORAL ONCE
Status: COMPLETED | OUTPATIENT
Start: 2020-10-01 | End: 2020-10-01

## 2020-10-01 RX ORDER — ACETAMINOPHEN 325 MG/1
975 TABLET ORAL ONCE
Status: COMPLETED | OUTPATIENT
Start: 2020-10-01 | End: 2020-10-01

## 2020-10-01 RX ORDER — ONDANSETRON 4 MG/1
4 TABLET, ORALLY DISINTEGRATING ORAL EVERY 30 MIN PRN
Status: DISCONTINUED | OUTPATIENT
Start: 2020-10-01 | End: 2020-10-02 | Stop reason: HOSPADM

## 2020-10-01 RX ORDER — FENTANYL CITRATE 50 UG/ML
25-50 INJECTION, SOLUTION INTRAMUSCULAR; INTRAVENOUS
Status: DISCONTINUED | OUTPATIENT
Start: 2020-10-01 | End: 2020-10-01 | Stop reason: HOSPADM

## 2020-10-01 RX ORDER — OXYCODONE HYDROCHLORIDE 5 MG/1
5-10 TABLET ORAL EVERY 4 HOURS PRN
Status: DISCONTINUED | OUTPATIENT
Start: 2020-10-01 | End: 2020-10-02 | Stop reason: HOSPADM

## 2020-10-01 RX ORDER — SODIUM CHLORIDE, SODIUM LACTATE, POTASSIUM CHLORIDE, CALCIUM CHLORIDE 600; 310; 30; 20 MG/100ML; MG/100ML; MG/100ML; MG/100ML
500 INJECTION, SOLUTION INTRAVENOUS CONTINUOUS
Status: DISCONTINUED | OUTPATIENT
Start: 2020-10-01 | End: 2020-10-01 | Stop reason: HOSPADM

## 2020-10-01 RX ORDER — DEXAMETHASONE SODIUM PHOSPHATE 4 MG/ML
4 INJECTION, SOLUTION INTRA-ARTICULAR; INTRALESIONAL; INTRAMUSCULAR; INTRAVENOUS; SOFT TISSUE EVERY 10 MIN PRN
Status: DISCONTINUED | OUTPATIENT
Start: 2020-10-01 | End: 2020-10-02 | Stop reason: HOSPADM

## 2020-10-01 RX ORDER — ERYTHROMYCIN 5 MG/G
OINTMENT OPHTHALMIC PRN
Status: DISCONTINUED | OUTPATIENT
Start: 2020-10-01 | End: 2020-10-01 | Stop reason: HOSPADM

## 2020-10-01 RX ORDER — ALBUTEROL SULFATE 0.83 MG/ML
2.5 SOLUTION RESPIRATORY (INHALATION) EVERY 4 HOURS PRN
Status: DISCONTINUED | OUTPATIENT
Start: 2020-10-01 | End: 2020-10-01 | Stop reason: HOSPADM

## 2020-10-01 RX ORDER — NALOXONE HYDROCHLORIDE 0.4 MG/ML
.1-.4 INJECTION, SOLUTION INTRAMUSCULAR; INTRAVENOUS; SUBCUTANEOUS
Status: DISCONTINUED | OUTPATIENT
Start: 2020-10-01 | End: 2020-10-02 | Stop reason: HOSPADM

## 2020-10-01 RX ORDER — HYDROMORPHONE HYDROCHLORIDE 1 MG/ML
.3-.5 INJECTION, SOLUTION INTRAMUSCULAR; INTRAVENOUS; SUBCUTANEOUS EVERY 10 MIN PRN
Status: DISCONTINUED | OUTPATIENT
Start: 2020-10-01 | End: 2020-10-02 | Stop reason: HOSPADM

## 2020-10-01 RX ORDER — MEPERIDINE HYDROCHLORIDE 25 MG/ML
12.5 INJECTION INTRAMUSCULAR; INTRAVENOUS; SUBCUTANEOUS
Status: DISCONTINUED | OUTPATIENT
Start: 2020-10-01 | End: 2020-10-02 | Stop reason: HOSPADM

## 2020-10-01 RX ORDER — TETRACAINE HYDROCHLORIDE 5 MG/ML
SOLUTION OPHTHALMIC PRN
Status: DISCONTINUED | OUTPATIENT
Start: 2020-10-01 | End: 2020-10-01 | Stop reason: HOSPADM

## 2020-10-01 RX ORDER — ONDANSETRON 2 MG/ML
4 INJECTION INTRAMUSCULAR; INTRAVENOUS EVERY 30 MIN PRN
Status: DISCONTINUED | OUTPATIENT
Start: 2020-10-01 | End: 2020-10-02 | Stop reason: HOSPADM

## 2020-10-01 RX ORDER — SODIUM CHLORIDE, SODIUM LACTATE, POTASSIUM CHLORIDE, CALCIUM CHLORIDE 600; 310; 30; 20 MG/100ML; MG/100ML; MG/100ML; MG/100ML
INJECTION, SOLUTION INTRAVENOUS CONTINUOUS
Status: DISCONTINUED | OUTPATIENT
Start: 2020-10-01 | End: 2020-10-02 | Stop reason: HOSPADM

## 2020-10-01 RX ORDER — LIDOCAINE HYDROCHLORIDE 20 MG/ML
INJECTION, SOLUTION INFILTRATION; PERINEURAL PRN
Status: DISCONTINUED | OUTPATIENT
Start: 2020-10-01 | End: 2020-10-01

## 2020-10-01 RX ADMIN — GABAPENTIN 300 MG: 300 CAPSULE ORAL at 08:00

## 2020-10-01 RX ADMIN — ACETAMINOPHEN 975 MG: 325 TABLET ORAL at 08:00

## 2020-10-01 RX ADMIN — PROPOFOL 100 MG: 10 INJECTION, EMULSION INTRAVENOUS at 09:15

## 2020-10-01 RX ADMIN — LIDOCAINE HYDROCHLORIDE 3 ML: 20 INJECTION, SOLUTION INFILTRATION; PERINEURAL at 09:15

## 2020-10-01 RX ADMIN — SODIUM CHLORIDE, SODIUM LACTATE, POTASSIUM CHLORIDE, CALCIUM CHLORIDE: 600; 310; 30; 20 INJECTION, SOLUTION INTRAVENOUS at 08:35

## 2020-10-01 ASSESSMENT — ENCOUNTER SYMPTOMS: DYSRHYTHMIAS: 0

## 2020-10-01 ASSESSMENT — MIFFLIN-ST. JEOR: SCORE: 1429.88

## 2020-10-01 ASSESSMENT — LIFESTYLE VARIABLES: TOBACCO_USE: 0

## 2020-10-01 NOTE — ANESTHESIA POSTPROCEDURE EVALUATION
Anesthesia POST Procedure Evaluation    Patient: Maia Woods   MRN:     [id number] Gender:   female   Age:    25 year old :      1995        Preoperative Diagnosis: Graves disease [E05.00]  Eyelid retraction or lag [H02.539]  Proptosis due to thyroid disorder [E05.00]   Procedure(s):  Right upper eyelid retraction repair   Postop Comments: No value filed.     Anesthesia Type: MAC       Disposition: Outpatient   Postop Pain Control: Uneventful            Sign Out: Well controlled pain   PONV: No   Neuro/Psych: Uneventful            Sign Out: Acceptable/Baseline neuro status   Airway/Respiratory: Uneventful            Sign Out: Acceptable/Baseline resp. status   CV/Hemodynamics: Uneventful            Sign Out: Acceptable CV status   Other NRE: NONE   DID A NON-ROUTINE EVENT OCCUR? No         Last Anesthesia Record Vitals:  CRNA VITALS  10/1/2020 0903 - 10/1/2020 1003      10/1/2020             Pulse:  81    Ht Rate:  80    SpO2:  99 %    Resp Rate (set):  10          Last PACU Vitals:  Vitals Value Taken Time   /78 10/01/20 0938   Temp 36.6  C (97.9  F) 10/01/20 0938   Pulse     Resp 16 10/01/20 0938   SpO2 100 % 10/01/20 0938   Temp src     NIBP     Pulse     SpO2     Resp     Temp     Ht Rate     Temp 2           Electronically Signed By: Jacy Vela MD, 2020, 2:53 PM

## 2020-10-01 NOTE — ANESTHESIA PREPROCEDURE EVALUATION
"Anesthesia Pre-Procedure Evaluation    Patient: Maia Woods   MRN:     [id number] Gender:   female   Age:    25 year old :      1995        Preoperative Diagnosis: Graves disease [E05.00]  Eyelid retraction or lag [H02.539]  Proptosis due to thyroid disorder [E05.00]   Procedure(s):  Right upper eyelid retraction repair     LABS:  CBC: No results found for: WBC, HGB, HCT, PLT  BMP:   Lab Results   Component Value Date     2020    POTASSIUM 4.5 2020    CHLORIDE 102 2020    CO2 26 2020    BUN 8 2020    CR 0.66 2020     (H) 2020     COAGS: No results found for: PTT, INR, FIBR  POC:   Lab Results   Component Value Date    HCG Negative 10/01/2020     OTHER:   Lab Results   Component Value Date    IRLANDA 8.5 2020        Preop Vitals    BP Readings from Last 3 Encounters:   10/01/20 125/79   20 123/77    Pulse Readings from Last 3 Encounters:   10/01/20 79   20 98      Resp Readings from Last 3 Encounters:   10/01/20 18   20 16    SpO2 Readings from Last 3 Encounters:   10/01/20 100%   20 97%      Temp Readings from Last 1 Encounters:   10/01/20 36.6  C (97.8  F) (Oral)    Ht Readings from Last 1 Encounters:   10/01/20 1.702 m (5' 7\")      Wt Readings from Last 1 Encounters:   10/01/20 66.2 kg (146 lb)    Estimated body mass index is 22.87 kg/m  as calculated from the following:    Height as of this encounter: 1.702 m (5' 7\").    Weight as of this encounter: 66.2 kg (146 lb).     LDA:  Peripheral IV 10/01/20 Right Hand (Active)   Site Assessment WDL 10/01/20 0809   Line Status Infusing 10/01/20 0809   Dressing Intervention New dressing  10/01/20 0809   Phlebitis Scale 0-->no symptoms 10/01/20 0809   Infiltration Scale 0 10/01/20 0809   Number of days: 0       Closed/Suction Drain Anterior Neck Bulb 10 Polish (Active)   Number of days: 101        Past Medical History:   Diagnosis Date     Hyperthyroidism     Grave's disease     IBS " (irritable bowel syndrome)      Migraine       Past Surgical History:   Procedure Laterality Date     AS RAD RESEC TONSIL/PILLARS  2005     GYN SURGERY      hymenectomy     THYROIDECTOMY N/A 6/22/2020    Procedure: TOTAL THYROIDECTOMY;  Surgeon: Mimi Chau MD;  Location: [facility] OR     wisdom teeth extraction        Allergies   Allergen Reactions     Sulfa Drugs Hives        Anesthesia Evaluation     . Pt has had prior anesthetic. Type: General    No history of anesthetic complications          ROS/MED HX    ENT/Pulmonary:      (-) tobacco use, asthma and sleep apnea   Neurologic:  - neg neurologic ROS     Cardiovascular:        (-) hypertension, CAD, CHF, arrhythmias, pulmonary hypertension and dyslipidemia   METS/Exercise Tolerance:  >4 METS   Hematologic:        (-) anemia   Musculoskeletal:        (-) arthritis   GI/Hepatic:     (+) Inflammatory bowel disease,      (-) GERD and other GI/Hepatic   Renal/Genitourinary:      (-) renal disease   Endo:     (+) thyroid problem (s/p thyroidectomy. Taking Synthroid ) .      Psychiatric:  - neg psychiatric ROS       Infectious Disease:  - neg infectious disease ROS       Malignancy:      - no malignancy   Other:    (+) No chance of pregnancy   - neg other ROS                     PHYSICAL EXAM:   Mental Status/Neuro: A/A/O   Airway: Facies: Feasible  Mallampati: I  Mouth/Opening: Full  TM distance: > 6 cm  Neck ROM: Full   Respiratory: Auscultation: CTAB     Resp. Rate: Normal     Resp. Effort: Normal      CV: Rhythm: Regular  Rate: Age appropriate  Heart: Normal Sounds  Edema: None   Comments:      Dental: Normal Dentition                Assessment:   ASA SCORE: 2    H&P: History and physical reviewed and following examination; no interval change.   Smoking Status:  Non-Smoker/Unknown   NPO Status: NPO Appropriate     Plan:   Anes. Type:  MAC   Pre-Medication: None   Induction:  N/a   Airway: Native Airway   Access/Monitoring: PIV   Maintenance: N/a     Postop Plan:    Postop Pain: None  Postop Sedation/Airway: Not planned  Disposition: Outpatient     PONV Management:   Adult Risk Factors: Female, Non-Smoker     CONSENT: Direct conversation   Plan and risks discussed with: Patient          Comments for Plan/Consent:  Discussed plan for MAC, including risk of aspiration pneumonia, backup plan of general anesthesia and risks of general anesthesia, including sore throat/hoarse voice, abrasions/damage to lips/tongue/teeth, nausea, rare complications (including medication reactions, cardiac, pulmonary).  Discussed that her surgeon will likely want her to be awake and cooperative during parts of the surgery.                  Jacy Vela MD

## 2020-10-01 NOTE — OP NOTE
PREOPERATIVE DIAGNOSIS: Right upper eyelid retraction.   POSTOPERATIVE DIAGNOSIS: Right upper eyelid retraction.   PROCEDURE PERFORMED:Right  upper eyelid retraction repair.   SURGEON: Mia Hoskins MD   ASSISTANT: None  ANESTHESIA: Monitored with local infiltration of a 50/50 mixture of 2% lidocaine with epinephrine and 0.5% Marcaine.   COMPLICATIONS: None.   ESTIMATED BLOOD LOSS: Less than 5 mL.   HISTORY: Maia Woods  presented with lid retraction of the right upper lid, leading to exposure keratitis. After the risks, benefits and alternatives to the proposed procedure were explained, informed consent was obtained.   DESCRIPTION OF PROCEDURE: Maia Woods  was brought to the operating room and placed supine on the operating table. IV sedation was given. The right upper eyelid was infiltrated with local anesthetic. The area  was prepped and draped in the typical sterile ophthalmic fashion. Attention was directed to the right  side. 4-0 silk suture was placed through the eyelid margin and the eyelid everted over a Desmarres retractor. The conjunctiva was hydrodissected from the underlying Lee's muscle with the local anesthetic mixture. A #15 blade was used to incise the conjunctiva at the superior tarsal border. Using a straight iris scissors, the conjunctiva was gently dissected from the underlying Lee's muscle. Conjunctiva was then released from the superior tarsal border. The Lee's muscle was then hydrodissected from the underlying levator aponeurosis. The Caprice scissor was used to dissect the Lee's muscle from the superior tarsal border using the high temperature cautery. Lee's muscle was resected. Hemostasis was obtained, the lid reverted to its normal position. The silk suture was removed. Antibiotic ointment applied to the eye. The patient tolerated the procedure well and  left the operating room in stable condition.   Mia Hoskins MD

## 2020-10-01 NOTE — DISCHARGE INSTRUCTIONS
Post-operative Instructions  Ophthalmic Plastic and Reconstructive Surgery    Mia Hoskins M.D.     All instructions apply to the operated eye(s) or eyelid(s).    Wound care and personal care  ? If a patch or bandage has been placed, please leave this in place until seen by your physician. Ensure that the bandage does not get wet when you take a shower.   ? Apply ice compresses 15 minutes of every hour while awake for 2 days. As long as there is no further bleeding, after two days, switch to warm water compresses for five minutes, four times a day until seen by your physician.   ? You may shower or wash your hair the day after surgery. Do not go swimming for at least 2 weeks to prevent contamination of your wounds.  ? You may go for walks and light activity is ok, but no heavy (over 15 pounds) lifting, bending or excessive straining for one week.   ? Do not apply make-up to the eyes or eyelids for 2 weeks after surgery.  ? Expect some swelling, bruising, black eye (even into the lower eyelids and cheeks). Also expect serum caking, crusting and discharge from the eye and/or incisions. A small amount of surface bleeding, and depending on the type of surgery, bleeding from the inside of the eyelid, is normal for the first 48 hours.  ? Avoid straining, bending at the waist, or lifting more than 15 pounds for 10 days. Activities that raise your blood pressure can worsen swelling, cause bleeding, and breaking of sutures. Like wise, sleeping with your head slightly elevated for the first several days can help swelling resolve more quickly.   ? Do continue to ambulate (walk) as you normally would - being sedentary after surgery can cause blood clots.   ? Your eye(s) and eyelid(s) may be painful and tender. This is normal after surgery.      Contact information and follow-up  ? Return to the Eye Clinic for a follow-up appointment with your physician as scheduled. If no appointment has been scheduled:   - [facility]  [place] eye clinic: [phone] for an appointment with Dr. Hoskins within 1 to 2 weeks from your date of surgery. If you are scheduled for a phone or video visit for your first postoperative appointment, please e-mail pictures to [email or web address] 1-2 days before your appointment.   -  [facility] eye clinic: [phone] for an appointment with Dr. Hoskins within 1 to 2 weeks from your date of surgery.     ? For severe pain, bleeding, or loss of vision, call the [facility] at [phone] or [facility] at [phone].     After hours or on weekends and holidays, call [phone] and ask to speak with the ophthalmologist on call.    An on call person can be reached after hours for concerns. The on call doctor should not call in medication refill requests after hours or on weekends, so please plan accordingly. An effort has been made to provide adequate pain medications following every surgery, and refills will not be provided in most instances.     Activity restrictions and driving  ? Avoid heavy lifting, bending, exercise or strenuous activity for 1 week after surgery.  You may resume other activities and return to work as tolerated.  ? You may not resume driving if you are using narcotic pain medications (such as Oxycodone, Norco, Percocet, Tylenol #3).    Medications  ? Restart all regular home medications and eye drops. If you take Plavix or  Aspirin on a regular basis, wait for 72 hours after your surgery before restarting these in order to decrease the risk of bleeding complications.  ? Avoid aspirin and aspirin-like medications (Motrin, Aleve, Ibuprofen, Olena-O'Brien etc) for 72 hours to reduce the risk of bleeding. You may take Tylenol (acetaminophen) for pain.  ? In addition to your home medications, take the following post-operative medications as prescribed by your physician.    ? Instill eye drops 3 times a day for 10 days.     M  [facility]  Home Care Following Anesthesia  For 24 hours after surgery:  1. Get plenty of rest.  A responsible adult must stay with you for at least 24 hours after you leave the surgery center.  2. Do not drive or use heavy equipment.  If you have weakness or tingling, don't drive or use heavy equipment until this feeling goes away.   3. Do not drink alcohol.   4. Avoid strenuous or risky activities.  Ask for help when climbing stairs.  5. You may feel lightheaded.  IF so, sit for a few minutes before standing.  Have someone help you get up.   6. If you have nausea (feel sick to your stomach): Drink only clear liquids such as apple juice, ginger ale, broth or 7-Up.  Rest may also help.  Be sure to drink enough fluids.  Move to a regular diet as you feel able.   7. You may have a slight fever.  Call the doctor if your fever is over 100 F (37.7 C) (taken under the tongue) or lasts longer than 24 hours.  8. You may have a dry mouth, a sore throat, muscle aches or trouble sleeping. These should go away after 24 hours.  9. Do not make important or legal decisions.            Tips for taking pain medications  To get the best pain relief possible, remember these points:    Take pain medications as directed, before pain becomes severe.    Pain medication can upset your stomach: taking it with food may help.    Constipation is a common side effect of pain medication. Drink plenty of  fluids.    Eat foods high in fiber. Take a stool softener if recommended by your doctor or pharmacist.    Do not drink alcohol, drive or operate machinery while taking pain medications.    Ask about other ways to control pain, such as with heat, ice or relaxation.    Tylenol/Acetaminophen Consumption  To help encourage the safe use of acetaminophen, the makers of TYLENOL  have lowered the maximum daily dose for single-ingredient Extra Strength TYLENOL  (acetaminophen) products sold in the [place] from 8 pills per  day (4,000 mg) to 6 pills per day (3,000 mg). The dosing interval has also changed from 2 pills every 4-6 hours to 2 pills every 6 hours.    If you feel your pain relief is insufficient, you may take Tylenol/Acetaminophen in addition to your narcotic pain medication.     Be careful not to exceed 3,000 mg of Tylenol/Acetaminophen in a 24 hour period from all sources.    If you are taking extra strength Tylenol/acetaminophen (500 mg), the maximum dose is 6 tablets in 24 hours.    If you are taking regular strength acetaminophen (325 mg), the maximum dose is 9 tablets in 24 hours.    Tylenol 975mg given at 8am. Next dose available after 2pm. Then follow package instructions.     Call a doctor for any of the followin. Signs of infection (fever, growing tenderness at the surgery site, a large amount of drainage or bleeding, severe pain, foul-smelling drainage, redness, swelling).  2. It has been over 8 to 10 hours since surgery and you are still not able to urinate (pass water).  3. Headache for over 24 hours.  4. Numbness, tingling or weakness the day after surgery (if you had spinal anesthesia).  5. Signs of Covid-19 infection (temperature over 100 degrees, shortness of breath, cough, loss of taste/smell, generalized body aches, persistent headache, chills, sore throat, nausea/vomiting/diarrhea)  Your doctor is:       Dr. Mia Hoskins, Ophthalmology: [phone]               Or dial [phone] and ask for the resident on call for:  Ophthalmology  For emergency care, call the:  [facility]:  [phone] (TTY for hearing impaired: [phone])

## 2020-10-01 NOTE — ANESTHESIA CARE TRANSFER NOTE
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    Anesthesia Type:   MAC     Note:  Airway :Room Air  Patient transferred to:Phase II  Comments: VSS and WNL, comfortable, no PONV, report to Sharron RNHandoff Report: Identifed the Patient, Identified the Reponsible Provider, Reviewed the pertinent medical history, Discussed the surgical course, Reviewed Intra-OP anesthesia mangement and issues during anesthesia, Set expectations for post-procedure period and Allowed opportunity for questions and acknowledgement of understanding      Vitals: (Last set prior to Anesthesia Care Transfer)    CRNA VITALS  10/1/2020 0903 - 10/1/2020 0938      10/1/2020             Pulse:  81    Ht Rate:  80    SpO2:  99 %    Resp Rate (set):  10                Electronically Signed By: JORGE Ramesh CRNA  October 1, 2020  9:38 AM

## 2020-10-15 ENCOUNTER — TELEPHONE (OUTPATIENT)
Dept: OPHTHALMOLOGY | Facility: CLINIC | Age: 25
End: 2020-10-15

## 2020-10-15 ENCOUNTER — VIRTUAL VISIT (OUTPATIENT)
Dept: OPHTHALMOLOGY | Facility: CLINIC | Age: 25
End: 2020-10-15
Payer: COMMERCIAL

## 2020-10-15 DIAGNOSIS — E05.00 GRAVES DISEASE: Primary | ICD-10-CM

## 2020-10-15 DIAGNOSIS — H02.539 EYELID RETRACTION OR LAG: ICD-10-CM

## 2020-10-15 PROCEDURE — 99024 POSTOP FOLLOW-UP VISIT: CPT | Performed by: OPHTHALMOLOGY

## 2020-10-15 NOTE — PROGRESS NOTES
Chief Complaint(s) and History of Present Illness(es)    Telephone visit     Maia Woods is status post right upper eyelid retraction repair.  She reports the eyelid is in an excellent position.       I have recommended:  Artificial tears as needed  Warm compresses  F/u 8 weeks    Attending Physician Attestation:  Complete documentation of historical and exam elements from today's encounter can be found in the full encounter summary report (not reduplicated in this progress note).  I personally obtained the chief complaint(s) and history of present illness.  I confirmed and edited as necessary the review of systems, past medical/surgical history, family history, social history, and examination findings as documented by others; and I examined the patient myself.  I personally reviewed the relevant tests, images, and reports as documented above.  I formulated and edited as necessary the assessment and plan and discussed the findings and management plan with the patient and family. - Mia Hoskins MD

## 2020-12-15 ENCOUNTER — OFFICE VISIT (OUTPATIENT)
Dept: OPHTHALMOLOGY | Facility: CLINIC | Age: 25
End: 2020-12-15
Payer: COMMERCIAL

## 2020-12-15 DIAGNOSIS — H02.539 EYELID RETRACTION OR LAG: Primary | ICD-10-CM

## 2020-12-15 PROCEDURE — 99024 POSTOP FOLLOW-UP VISIT: CPT | Performed by: OPHTHALMOLOGY

## 2020-12-15 ASSESSMENT — TONOMETRY
IOP_METHOD: ICARE
OD_IOP_MMHG: 16
OS_IOP_MMHG: 17

## 2020-12-15 ASSESSMENT — VISUAL ACUITY
METHOD: SNELLEN - LINEAR
OS_SC: 20/20
OD_SC: 20/20

## 2020-12-15 NOTE — NURSING NOTE
Chief Complaints and History of Present Illnesses   Patient presents with     Post Op (Ophthalmology) Right Eye     Chief Complaint(s) and History of Present Illness(es)     Post Op (Ophthalmology) Right Eye     Laterality: right eye    Onset: years ago    Frequency: constantly    Course: gradually improving    Treatments tried: no treatments    Pain scale: 0/10              Comments     S/p Right  upper eyelid retraction repair on 10/1/2020. Pt states healing fine, concerned that the right eye is still swollen. No pain. Done with post op medication.    ADÁN Cool COT 7:39 AM December 15, 2020

## 2020-12-15 NOTE — PROGRESS NOTES
Chief Complaint(s) and History of Present Illness(es)     Post Op (Ophthalmology) Right Eye     Laterality: right eye    Onset: years ago    Frequency: constantly    Course: gradually improving    Treatments tried: no treatments    Pain scale: 0/10              Comments     S/p Right  upper eyelid retraction repair on 10/1/2020. Pt states healing fine, concerned that the right eye is still swollen. No pain. Done with post op medication.    Oma Quiñonez, COT COT 7:39 AM December 15, 2020        Maia Woods is about 2 months status post right upper eyelid retraction repair.  She is doing well. Good symmetry, slight Herring elevation left upper lid, but right upper lid is still a bit swollen. Max 1 mm asymmetry, I suspect will settle as remaining edema resolves.     She will follow up with me on an as needed basis.    Complete documentation of historical and exam elements from today's encounter can be found in the full encounter summary report (not reduplicated in this progress note). I personally obtained the chief complaint(s) and history of present illness.  I confirmed and edited as necessary the review of systems, past medical/surgical history, family history, social history, and examination findings as documented by others; and I examined the patient myself. I personally reviewed the relevant tests, images, and reports as documented above. I formulated and edited as necessary the assessment and plan and discussed the findings and management plan with the patient and family. - Mia Hoskins MD

## 2021-01-04 ENCOUNTER — HEALTH MAINTENANCE LETTER (OUTPATIENT)
Age: 26
End: 2021-01-04

## 2021-02-22 ENCOUNTER — MYC MEDICAL ADVICE (OUTPATIENT)
Dept: OPHTHALMOLOGY | Facility: CLINIC | Age: 26
End: 2021-02-22

## 2021-02-23 ENCOUNTER — TELEPHONE (OUTPATIENT)
Dept: OPHTHALMOLOGY | Facility: CLINIC | Age: 26
End: 2021-02-23

## 2021-03-03 ENCOUNTER — OFFICE VISIT (OUTPATIENT)
Dept: OPHTHALMOLOGY | Facility: CLINIC | Age: 26
End: 2021-03-03
Payer: COMMERCIAL

## 2021-03-03 ENCOUNTER — TELEPHONE (OUTPATIENT)
Dept: OPHTHALMOLOGY | Facility: CLINIC | Age: 26
End: 2021-03-03

## 2021-03-03 DIAGNOSIS — E05.00 PROPTOSIS DUE TO THYROID DISORDER: ICD-10-CM

## 2021-03-03 DIAGNOSIS — E05.00 GRAVES DISEASE: Primary | ICD-10-CM

## 2021-03-03 DIAGNOSIS — E05.00 GRAVES DISEASE: ICD-10-CM

## 2021-03-03 DIAGNOSIS — H02.539 EYELID RETRACTION OR LAG: ICD-10-CM

## 2021-03-03 PROCEDURE — 36415 COLL VENOUS BLD VENIPUNCTURE: CPT | Performed by: OPHTHALMOLOGY

## 2021-03-03 PROCEDURE — 99214 OFFICE O/P EST MOD 30 MIN: CPT | Performed by: OPHTHALMOLOGY

## 2021-03-03 PROCEDURE — 99000 SPECIMEN HANDLING OFFICE-LAB: CPT | Performed by: OPHTHALMOLOGY

## 2021-03-03 PROCEDURE — 84445 ASSAY OF TSI GLOBULIN: CPT | Mod: 90 | Performed by: OPHTHALMOLOGY

## 2021-03-03 ASSESSMENT — EXTERNAL EXAM - RIGHT EYE: OD_EXAM: UPPER LID RETRACTION

## 2021-03-03 ASSESSMENT — CONF VISUAL FIELD
METHOD: COUNTING FINGERS
OS_NORMAL: 1
OD_NORMAL: 1

## 2021-03-03 ASSESSMENT — MARGIN REFLEX DISTANCE
OS_MRD2: 5
OS_MRD1: 5
OD_MRD2: 5
OD_MRD1: 4

## 2021-03-03 ASSESSMENT — TONOMETRY
IOP_METHOD: ICARE
OD_IOP_MMHG: 22
OS_IOP_MMHG: 23

## 2021-03-03 ASSESSMENT — VISUAL ACUITY
OS_SC: 20/15
OS_SC+: -1
METHOD: SNELLEN - LINEAR
OD_SC: 20/15
OD_SC+: -1

## 2021-03-03 ASSESSMENT — SLIT LAMP EXAM - LIDS: COMMENTS: NORMAL WELL HEALED

## 2021-03-03 ASSESSMENT — CUP TO DISC RATIO
OD_RATIO: 0.25
OS_RATIO: 0.3

## 2021-03-03 ASSESSMENT — EXTERNAL EXAM - LEFT EYE: OS_EXAM: NORMAL

## 2021-03-03 NOTE — LETTER
3/3/2021         RE: Maia Woods  [address] [address]        Dear Colleague,    Thank you for referring your patient, Maia Woods, to the [facility]. Please see a copy of my visit note below.         Chief Complaint(s) and History of Present Illness(es)     Post Op (Ophthalmology) Right Eye     Laterality: right eye              Comments     S/P right upper eyelid retraction repair on 10/01/2020, LINDSEY. Patient   noted right lid still is swollen and eyes still bulge. No pain. Using AT   drops prn.          Maia Woods is a 25-year-old female presenting for follow-up of thyroid eye disease.  She was diagnosed with Graves' disease in November 2019 and is status post thyroidectomy.  In regards to her eyes she noted mild right proptosis as well as upper eyelid retraction which ultimately stabilized.  She underwent right upper eyelid retraction repair in October 2020. She initially looked great and was pleased with the outcome.     Since then, she feels her left upper eyelid has started to swell over the past several weeks, and is noticing under eye bags as well. She last saw Dr. Jones in Nov or December of 2020. She is due to get blood work.    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 Graves disease - Both Eyes  Seeing Dr. Lawler in April, due for labs. She is s/p thyroidectomy.   2. Proptosis due to thyroid disorder  Thyroid stimulating immunoglobulin previously 12.5 11/2019. Will recheck appears to be back in the active phase of the disease. Previously was markedly asymmetric Thyroid eye disease with right eye involvement, now has involvement of her left eye.    3. Eyelid retraction or lag  Now involving left eye, right eye looks great.      We discussed she appears to have returned to the active phase. Resume Selenium.  Discussed options of steroids, radiation, Tepezza or observation. Given Tepezza is not available, and she remains in the mild to moderate category, I would observe at this time. Return to thyroid eye disease clinic in about 3 months.     Photo documentation was obtained.    Patient disposition:   No follow-ups on file.        Attending Physician Attestation: Complete documentation of historical and exam elements from today's encounter can be found in the full encounter summary report (not reduplicated in this progress note). I personally obtained the chief complaint(s) and history of present illness. I confirmed and edited as necessary the review of systems, past medical/surgical history, family history, social history, and examination findings as documented by others; and I examined the patient myself. I personally reviewed the relevant tests, images, and reports as documented above. I formulated and edited as necessary the assessment and plan and discussed the findings and management plan with the patient.  -Mia Hoskins MD          Again, thank you for allowing me to participate in the care of your patient.        Sincerely,        Mia Hoskins MD

## 2021-03-03 NOTE — PROGRESS NOTES
Chief Complaint(s) and History of Present Illness(es)     Post Op (Ophthalmology) Right Eye     Laterality: right eye              Comments     S/P right upper eyelid retraction repair on 10/01/2020, LINDSEY. Patient   noted right lid still is swollen and eyes still bulge. No pain. Using AT   drops prn.          Maia Woods is a 25-year-old female presenting for follow-up of thyroid eye disease.  She was diagnosed with Graves' disease in November 2019 and is status post thyroidectomy.  In regards to her eyes she noted mild right proptosis as well as upper eyelid retraction which ultimately stabilized.  She underwent right upper eyelid retraction repair in October 2020. She initially looked great and was pleased with the outcome.     Since then, she feels her left upper eyelid has started to swell over the past several weeks, and is noticing under eye bags as well. She last saw Dr. Jones in Nov or December of 2020. She is due to get blood work.    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 Graves disease - Both Eyes  Seeing Dr. Lawler in April, due for labs. She is s/p thyroidectomy.   2. Proptosis due to thyroid disorder  Thyroid stimulating immunoglobulin previously 12.5 11/2019. Will recheck appears to be back in the active phase of the disease. Previously was markedly asymmetric Thyroid eye disease with right eye involvement, now has involvement of her left eye.    3. Eyelid retraction or lag  Now involving left eye, right eye looks great.      We discussed she appears to have returned to the active phase. Resume Selenium. Discussed options of steroids, radiation, Tepezza or observation. Given Tepezza is not available, and she remains in the mild to moderate category, I would observe at this time. Return to thyroid eye disease clinic in about 3 months.     Photo documentation was  obtained.    Patient disposition:   No follow-ups on file.        Attending Physician Attestation: Complete documentation of historical and exam elements from today's encounter can be found in the full encounter summary report (not reduplicated in this progress note). I personally obtained the chief complaint(s) and history of present illness. I confirmed and edited as necessary the review of systems, past medical/surgical history, family history, social history, and examination findings as documented by others; and I examined the patient myself. I personally reviewed the relevant tests, images, and reports as documented above. I formulated and edited as necessary the assessment and plan and discussed the findings and management plan with the patient.  -Mia Hoskins MD

## 2021-03-03 NOTE — NURSING NOTE
Chief Complaints and History of Present Illnesses   Patient presents with     Post Op (Ophthalmology) Right Eye       Chief Complaint(s) and History of Present Illness(es)     Post Op (Ophthalmology) Right Eye     Laterality: right eye              Comments     S/P right upper eyelid retraction repair on 10/01/2020, LINDSEY. Patient noted right lid still is swollen and eyes still bulge. No pain. Using AT drops prn.                Ryan Junior, Ophthalmic Assistant

## 2021-03-08 LAB — TSI SER-ACNC: 7.2 TSI INDEX

## 2021-05-28 ENCOUNTER — TELEPHONE (OUTPATIENT)
Dept: OPHTHALMOLOGY | Facility: CLINIC | Age: 26
End: 2021-05-28

## 2021-06-01 ENCOUNTER — OFFICE VISIT (OUTPATIENT)
Dept: OPHTHALMOLOGY | Facility: CLINIC | Age: 26
End: 2021-06-01
Attending: OPHTHALMOLOGY
Payer: COMMERCIAL

## 2021-06-01 DIAGNOSIS — H57.89 THYROID EYE DISEASE: Primary | ICD-10-CM

## 2021-06-01 DIAGNOSIS — E07.9 THYROID EYE DISEASE: Primary | ICD-10-CM

## 2021-06-01 PROCEDURE — G0463 HOSPITAL OUTPT CLINIC VISIT: HCPCS | Mod: 25

## 2021-06-01 PROCEDURE — 99213 OFFICE O/P EST LOW 20 MIN: CPT | Mod: GC | Performed by: OPHTHALMOLOGY

## 2021-06-01 ASSESSMENT — EXTERNAL EXAM - RIGHT EYE: OD_EXAM: UPPER LID RETRACTION

## 2021-06-01 ASSESSMENT — TONOMETRY
OD_IOP_MMHG: 14
IOP_METHOD: ICARE
OS_IOP_MMHG: 15

## 2021-06-01 ASSESSMENT — VISUAL ACUITY
METHOD: SNELLEN - LINEAR
OS_CC: 20/20
OD_CC: 20/20

## 2021-06-01 ASSESSMENT — EXTERNAL EXAM - LEFT EYE: OS_EXAM: NORMAL

## 2021-06-01 ASSESSMENT — CUP TO DISC RATIO
OS_RATIO: 0.3
OD_RATIO: 0.25

## 2021-06-01 ASSESSMENT — SLIT LAMP EXAM - LIDS: COMMENTS: NORMAL WELL HEALED

## 2021-06-01 ASSESSMENT — MARGIN REFLEX DISTANCE
OS_MRD1: 5
OD_MRD1: 2

## 2021-06-01 NOTE — NURSING NOTE
Chief Complaint(s) and History of Present Illness(es)     Thyroid Disease     Laterality: both eyes    Associated symptoms: Negative for eye pain, redness and tearing              Comments     Takes selenium and levothyroxine.

## 2021-06-01 NOTE — PROGRESS NOTES
Maia Woods is a 24 year old F who presents for follow up of thyroid eye disease. Stable subjectively since last visit since march 2021. She is a never smoker. She is taking selenium 200 micrograms daily.  Now s/p total thyroidectomy 6/22/20 and doing well since surgery.  No significant changes in symptoms since then. Status post right upper eyelid retraction repair in October 2020  Eye symptoms started in January 2020.    End of January 2021 she noticed ptosis in the right upper eyelid that has been stable since onset and it does not fluctuate during the day     Referring physician: Dr. Jones (endocrinology)     Thyroid history:  Diagnosed when? 11/2019   ARSHAD: N/A  Thyroidectomy: 6/22/2020     TSI (date):  7.2 (3/3/2021)      Previous results: 12.5 (11/2019)         Eye symptoms (since when):   Proptosis (better/worse/same since last visit): Same - right eye  Diplopia (better/worse/same since last visit): NA  Eyelid retraction(better/worse/same since last visit): none  Tearing(better/worse/same since last visit): none  Redness (better/worse/same since last visit):none  Pain ((better/worse/same since last visit):none  Pain to move the eyes (better/worse/same since last visit): none  Blurred vision: none     Ocular history:   Orbital decompression (date, details): N/A  Strabismus surgery (date, details): N/A  Eyelid surgery (date, details):  right upper eyelid retraction repair in October 2020     Exam:   Kena (base):100   Better/worse same: same  Strabismus (better/worse/same): same  Eyelid retraction (better/worse/same): ptosis right eyelid     1. Thyroid eye disease   2. Ptosis right upper lid and relative retraction left upper eyelid     - Will consider eyelid surgery in the future- discussed with Dr. Hoskins today.  - since her TSI is still elevated will observe for the next few months, if stable will consider eyelid surgery  - follow up in 3-4 months     25 minutes were spent on the date of the  encounter by me doing chart review, history and exam, documentation, and further activities as noted above    Complete documentation of historical and exam elements from today's encounter can be found in the full encounter summary report (not reduplicated in this progress note).  I personally obtained the chief complaint(s) and history of present illness.  I confirmed and edited as necessary the review of systems, past medical/surgical history, family history, social history, and examination findings as documented by others; and I examined the patient myself.  I personally reviewed the relevant tests, images, and reports as documented above.  I formulated and edited as necessary the assessment and plan and discussed the findings and management plan with the patient and family.    MD Kylie Martinez MD  Neuro-ophthalmology fellow  [facility]

## 2021-08-18 ENCOUNTER — TELEPHONE (OUTPATIENT)
Dept: OPHTHALMOLOGY | Facility: CLINIC | Age: 26
End: 2021-08-18

## 2021-08-18 DIAGNOSIS — E07.9 THYROID EYE DISEASE: Primary | ICD-10-CM

## 2021-08-18 DIAGNOSIS — H57.89 THYROID EYE DISEASE: Primary | ICD-10-CM

## 2021-08-18 NOTE — TELEPHONE ENCOUNTER
Patient called for scheduling a Lab appointment. Scheduled patient accordingly and sent appointment letter to confirmed address.-Per Patient

## 2021-08-25 ENCOUNTER — LAB (OUTPATIENT)
Dept: LAB | Facility: CLINIC | Age: 26
End: 2021-08-25
Attending: OPHTHALMOLOGY
Payer: COMMERCIAL

## 2021-08-25 DIAGNOSIS — E07.9 THYROID EYE DISEASE: ICD-10-CM

## 2021-08-25 DIAGNOSIS — H57.89 THYROID EYE DISEASE: ICD-10-CM

## 2021-08-25 PROCEDURE — 84445 ASSAY OF TSI GLOBULIN: CPT | Mod: 90 | Performed by: PATHOLOGY

## 2021-08-25 PROCEDURE — 36415 COLL VENOUS BLD VENIPUNCTURE: CPT | Performed by: PATHOLOGY

## 2021-09-01 LAB — TSI SER-ACNC: 4.6 TSI INDEX

## 2021-09-03 ENCOUNTER — TELEPHONE (OUTPATIENT)
Dept: OPHTHALMOLOGY | Facility: CLINIC | Age: 26
End: 2021-09-03

## 2021-09-07 ENCOUNTER — TELEPHONE (OUTPATIENT)
Dept: OPHTHALMOLOGY | Facility: CLINIC | Age: 26
End: 2021-09-07

## 2021-09-07 ENCOUNTER — OFFICE VISIT (OUTPATIENT)
Dept: OPHTHALMOLOGY | Facility: CLINIC | Age: 26
End: 2021-09-07
Attending: OPHTHALMOLOGY
Payer: COMMERCIAL

## 2021-09-07 DIAGNOSIS — E07.9 THYROID EYE DISEASE: Primary | ICD-10-CM

## 2021-09-07 DIAGNOSIS — H57.89 THYROID EYE DISEASE: Primary | ICD-10-CM

## 2021-09-07 DIAGNOSIS — E05.00 PROPTOSIS DUE TO THYROID DISORDER: ICD-10-CM

## 2021-09-07 PROCEDURE — 92012 INTRM OPH EXAM EST PATIENT: CPT | Performed by: OPHTHALMOLOGY

## 2021-09-07 PROCEDURE — G0463 HOSPITAL OUTPT CLINIC VISIT: HCPCS

## 2021-09-07 ASSESSMENT — TONOMETRY
OS_IOP_MMHG: 17
OD_IOP_MMHG: 18
IOP_METHOD: ICARE

## 2021-09-07 ASSESSMENT — EXTERNAL EXAM - LEFT EYE: OS_EXAM: NORMAL

## 2021-09-07 ASSESSMENT — CUP TO DISC RATIO
OS_RATIO: 0.3
OD_RATIO: 0.25

## 2021-09-07 ASSESSMENT — VISUAL ACUITY
OS_SC: 20/20
OD_SC: 20/20
METHOD: SNELLEN - LINEAR

## 2021-09-07 ASSESSMENT — SLIT LAMP EXAM - LIDS: COMMENTS: NORMAL WELL HEALED

## 2021-09-07 ASSESSMENT — MARGIN REFLEX DISTANCE
OS_MRD1: 5
OD_MRD1: 2

## 2021-09-07 ASSESSMENT — CONF VISUAL FIELD
OS_NORMAL: 1
OD_NORMAL: 1
METHOD: COUNTING FINGERS

## 2021-09-07 ASSESSMENT — EXTERNAL EXAM - RIGHT EYE: OD_EXAM: RUL PTOSIS

## 2021-09-07 NOTE — PROGRESS NOTES
1. 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    Maia Woods is a 24 year old F who presents for follow up of thyroid eye disease. She appears inactive today with stable bilateral proptosis.  Discussed bilateral orbital decompression with Dr. Hoskins versus eyelid surgery. Patient plans to proceed with orbital decompression.       Interval history since 6/01/2021:  Since the last visit, she has not noticed any changes in her symptoms. She continues to notice that the right eye is more closed than the left eye.     Referring physician: Dr. Jones (endocrinology)     Thyroid history:  Diagnosed when? 11/2019   ARSHAD: N/A  Thyroidectomy: 6/22/2020     TSI (date):  4.6 (8/25/2021) 7.2 (3/3/2021)      Previous results: 12.5 (11/2019)           Bilateral lateral orbital decompression with navigation   Discussed R/B/A of orbital decompression discussed including infection, bleeding, double vision, change in pupil size, vision loss, spinal fluid leak, as well as risks of general anesthesia including systemic issues or even death (exceedingly rare). I answered her questions.    Mia Hoskins MD        Complete documentation of historical and exam elements from today's encounter can be found in the full encounter summary report (not reduplicated in this progress note).  I personally obtained the chief complaint(s) and history of present illness.  I confirmed and edited as necessary the review of systems, past medical/surgical history, family history, social history, and examination findings as documented by others; and I examined the patient myself.  I personally reviewed the relevant tests, images, and reports as documented above.  I formulated and edited as necessary the assessment and plan and discussed the findings and management plan with the patient and family     John Paul Zuñiga MD

## 2021-09-07 NOTE — NURSING NOTE
Chief Complaint(s) and History of Present Illness(es)     Thyroid Disease     Laterality: both eyes    Course: stable    Associated symptoms: Negative for eye pain, redness, tearing, photophobia and double vision              Comments     Labs done 8/26/21: TSI 4.6   No changes in vision notes, denies diplopia. Rarely uses artificial tears, rare redness or tearing. No extreme photophobia  Taking selenium and synthroid     Inf: pt

## 2021-09-08 ENCOUNTER — ANCILLARY PROCEDURE (OUTPATIENT)
Dept: CT IMAGING | Facility: CLINIC | Age: 26
End: 2021-09-08
Attending: OPHTHALMOLOGY
Payer: COMMERCIAL

## 2021-09-08 DIAGNOSIS — H57.89 THYROID EYE DISEASE: ICD-10-CM

## 2021-09-08 DIAGNOSIS — E05.00 PROPTOSIS DUE TO THYROID DISORDER: ICD-10-CM

## 2021-09-08 DIAGNOSIS — E07.9 THYROID EYE DISEASE: ICD-10-CM

## 2021-09-08 PROCEDURE — 70480 CT ORBIT/EAR/FOSSA W/O DYE: CPT | Performed by: RADIOLOGY

## 2021-09-13 DIAGNOSIS — Z11.59 ENCOUNTER FOR SCREENING FOR OTHER VIRAL DISEASES: ICD-10-CM

## 2021-10-10 ENCOUNTER — HEALTH MAINTENANCE LETTER (OUTPATIENT)
Age: 26
End: 2021-10-10

## 2021-10-11 ENCOUNTER — LAB (OUTPATIENT)
Dept: LAB | Facility: CLINIC | Age: 26
End: 2021-10-11
Payer: COMMERCIAL

## 2021-10-11 DIAGNOSIS — Z11.59 ENCOUNTER FOR SCREENING FOR OTHER VIRAL DISEASES: ICD-10-CM

## 2021-10-11 PROCEDURE — U0005 INFEC AGEN DETEC AMPLI PROBE: HCPCS

## 2021-10-11 PROCEDURE — U0003 INFECTIOUS AGENT DETECTION BY NUCLEIC ACID (DNA OR RNA); SEVERE ACUTE RESPIRATORY SYNDROME CORONAVIRUS 2 (SARS-COV-2) (CORONAVIRUS DISEASE [COVID-19]), AMPLIFIED PROBE TECHNIQUE, MAKING USE OF HIGH THROUGHPUT TECHNOLOGIES AS DESCRIBED BY CMS-2020-01-R: HCPCS

## 2021-10-12 LAB — SARS-COV-2 RNA RESP QL NAA+PROBE: NEGATIVE

## 2021-10-13 ENCOUNTER — ANESTHESIA EVENT (OUTPATIENT)
Dept: SURGERY | Facility: AMBULATORY SURGERY CENTER | Age: 26
End: 2021-10-13
Payer: COMMERCIAL

## 2021-10-14 ENCOUNTER — ANESTHESIA (OUTPATIENT)
Dept: SURGERY | Facility: AMBULATORY SURGERY CENTER | Age: 26
End: 2021-10-14
Payer: COMMERCIAL

## 2021-10-14 ENCOUNTER — HOSPITAL ENCOUNTER (OUTPATIENT)
Facility: AMBULATORY SURGERY CENTER | Age: 26
End: 2021-10-14
Attending: OPHTHALMOLOGY
Payer: COMMERCIAL

## 2021-10-14 VITALS
HEIGHT: 67 IN | BODY MASS INDEX: 22.76 KG/M2 | DIASTOLIC BLOOD PRESSURE: 79 MMHG | RESPIRATION RATE: 12 BRPM | SYSTOLIC BLOOD PRESSURE: 112 MMHG | TEMPERATURE: 97.8 F | WEIGHT: 145 LBS | OXYGEN SATURATION: 99 % | HEART RATE: 93 BPM

## 2021-10-14 DIAGNOSIS — E05.00 PROPTOSIS DUE TO THYROID DISORDER: ICD-10-CM

## 2021-10-14 DIAGNOSIS — H57.89 THYROID EYE DISEASE: ICD-10-CM

## 2021-10-14 DIAGNOSIS — E07.9 THYROID EYE DISEASE: ICD-10-CM

## 2021-10-14 LAB
HCG UR QL: NEGATIVE
INTERNAL QC OK POCT: NORMAL

## 2021-10-14 PROCEDURE — 67445 EXPLR/DECOMPRESS EYE SOCKET: CPT | Mod: 50 | Performed by: OPHTHALMOLOGY

## 2021-10-14 PROCEDURE — 67445 EXPLR/DECOMPRESS EYE SOCKET: CPT | Mod: 50

## 2021-10-14 PROCEDURE — 81025 URINE PREGNANCY TEST: CPT | Performed by: PATHOLOGY

## 2021-10-14 RX ORDER — ERYTHROMYCIN 5 MG/G
OINTMENT OPHTHALMIC PRN
Status: DISCONTINUED | OUTPATIENT
Start: 2021-10-14 | End: 2021-10-14 | Stop reason: HOSPADM

## 2021-10-14 RX ORDER — DEXAMETHASONE SODIUM PHOSPHATE 4 MG/ML
INJECTION, SOLUTION INTRA-ARTICULAR; INTRALESIONAL; INTRAMUSCULAR; INTRAVENOUS; SOFT TISSUE PRN
Status: DISCONTINUED | OUTPATIENT
Start: 2021-10-14 | End: 2021-10-14

## 2021-10-14 RX ORDER — SCOLOPAMINE TRANSDERMAL SYSTEM 1 MG/1
1 PATCH, EXTENDED RELEASE TRANSDERMAL
Status: DISCONTINUED | OUTPATIENT
Start: 2021-10-14 | End: 2021-10-15 | Stop reason: HOSPADM

## 2021-10-14 RX ORDER — ACETAMINOPHEN 325 MG/1
975 TABLET ORAL ONCE
Status: COMPLETED | OUTPATIENT
Start: 2021-10-14 | End: 2021-10-14

## 2021-10-14 RX ORDER — TRIAMCINOLONE ACETONIDE 40 MG/ML
INJECTION, SUSPENSION INTRA-ARTICULAR; INTRAMUSCULAR PRN
Status: DISCONTINUED | OUTPATIENT
Start: 2021-10-14 | End: 2021-10-14 | Stop reason: HOSPADM

## 2021-10-14 RX ORDER — PROPOFOL 10 MG/ML
INJECTION, EMULSION INTRAVENOUS PRN
Status: DISCONTINUED | OUTPATIENT
Start: 2021-10-14 | End: 2021-10-14

## 2021-10-14 RX ORDER — PROPOFOL 10 MG/ML
INJECTION, EMULSION INTRAVENOUS CONTINUOUS PRN
Status: DISCONTINUED | OUTPATIENT
Start: 2021-10-14 | End: 2021-10-14

## 2021-10-14 RX ORDER — GABAPENTIN 300 MG/1
300 CAPSULE ORAL
Status: COMPLETED | OUTPATIENT
Start: 2021-10-14 | End: 2021-10-14

## 2021-10-14 RX ORDER — SODIUM CHLORIDE, SODIUM LACTATE, POTASSIUM CHLORIDE, CALCIUM CHLORIDE 600; 310; 30; 20 MG/100ML; MG/100ML; MG/100ML; MG/100ML
INJECTION, SOLUTION INTRAVENOUS CONTINUOUS
Status: DISCONTINUED | OUTPATIENT
Start: 2021-10-14 | End: 2021-10-14 | Stop reason: HOSPADM

## 2021-10-14 RX ORDER — ONDANSETRON 2 MG/ML
INJECTION INTRAMUSCULAR; INTRAVENOUS PRN
Status: DISCONTINUED | OUTPATIENT
Start: 2021-10-14 | End: 2021-10-14

## 2021-10-14 RX ORDER — GLYCOPYRROLATE 0.2 MG/ML
INJECTION, SOLUTION INTRAMUSCULAR; INTRAVENOUS PRN
Status: DISCONTINUED | OUTPATIENT
Start: 2021-10-14 | End: 2021-10-14

## 2021-10-14 RX ORDER — FENTANYL CITRATE 50 UG/ML
INJECTION, SOLUTION INTRAMUSCULAR; INTRAVENOUS PRN
Status: DISCONTINUED | OUTPATIENT
Start: 2021-10-14 | End: 2021-10-14

## 2021-10-14 RX ORDER — FENTANYL CITRATE 50 UG/ML
50 INJECTION, SOLUTION INTRAMUSCULAR; INTRAVENOUS EVERY 5 MIN PRN
Status: DISCONTINUED | OUTPATIENT
Start: 2021-10-14 | End: 2021-10-14 | Stop reason: HOSPADM

## 2021-10-14 RX ORDER — LABETALOL HYDROCHLORIDE 5 MG/ML
10 INJECTION, SOLUTION INTRAVENOUS
Status: DISCONTINUED | OUTPATIENT
Start: 2021-10-14 | End: 2021-10-14 | Stop reason: HOSPADM

## 2021-10-14 RX ORDER — CEFAZOLIN SODIUM 2 G/100ML
INJECTION, SOLUTION INTRAVENOUS PRN
Status: DISCONTINUED | OUTPATIENT
Start: 2021-10-14 | End: 2021-10-14

## 2021-10-14 RX ORDER — ONDANSETRON 2 MG/ML
4 INJECTION INTRAMUSCULAR; INTRAVENOUS EVERY 30 MIN PRN
Status: DISCONTINUED | OUTPATIENT
Start: 2021-10-14 | End: 2021-10-14 | Stop reason: HOSPADM

## 2021-10-14 RX ORDER — ONDANSETRON 4 MG/1
4 TABLET, ORALLY DISINTEGRATING ORAL EVERY 30 MIN PRN
Status: DISCONTINUED | OUTPATIENT
Start: 2021-10-14 | End: 2021-10-14 | Stop reason: HOSPADM

## 2021-10-14 RX ORDER — ERYTHROMYCIN 5 MG/G
OINTMENT OPHTHALMIC
Qty: 3.5 G | Refills: 0 | Status: SHIPPED | OUTPATIENT
Start: 2021-10-14 | End: 2022-03-15

## 2021-10-14 RX ORDER — OXYCODONE HYDROCHLORIDE 5 MG/1
5 TABLET ORAL EVERY 4 HOURS PRN
Status: DISCONTINUED | OUTPATIENT
Start: 2021-10-14 | End: 2021-10-15 | Stop reason: HOSPADM

## 2021-10-14 RX ORDER — KETOROLAC TROMETHAMINE 30 MG/ML
INJECTION, SOLUTION INTRAMUSCULAR; INTRAVENOUS PRN
Status: DISCONTINUED | OUTPATIENT
Start: 2021-10-14 | End: 2021-10-14

## 2021-10-14 RX ORDER — LIDOCAINE 40 MG/G
CREAM TOPICAL
Status: DISCONTINUED | OUTPATIENT
Start: 2021-10-14 | End: 2021-10-14 | Stop reason: HOSPADM

## 2021-10-14 RX ORDER — HYDROMORPHONE HYDROCHLORIDE 1 MG/ML
0.4 INJECTION, SOLUTION INTRAMUSCULAR; INTRAVENOUS; SUBCUTANEOUS EVERY 5 MIN PRN
Status: DISCONTINUED | OUTPATIENT
Start: 2021-10-14 | End: 2021-10-14 | Stop reason: HOSPADM

## 2021-10-14 RX ORDER — NEOMYCIN SULFATE, POLYMYXIN B SULFATE AND DEXAMETHASONE 3.5; 10000; 1 MG/ML; [USP'U]/ML; MG/ML
SUSPENSION/ DROPS OPHTHALMIC
Qty: 5 ML | Refills: 0 | Status: SHIPPED | OUTPATIENT
Start: 2021-10-14 | End: 2022-03-15

## 2021-10-14 RX ORDER — LIDOCAINE HYDROCHLORIDE 20 MG/ML
INJECTION, SOLUTION INFILTRATION; PERINEURAL PRN
Status: DISCONTINUED | OUTPATIENT
Start: 2021-10-14 | End: 2021-10-14

## 2021-10-14 RX ORDER — OXYCODONE HYDROCHLORIDE 5 MG/1
5 TABLET ORAL EVERY 6 HOURS PRN
Qty: 15 TABLET | Refills: 0 | Status: SHIPPED | OUTPATIENT
Start: 2021-10-14 | End: 2021-10-17

## 2021-10-14 RX ADMIN — SODIUM CHLORIDE, SODIUM LACTATE, POTASSIUM CHLORIDE, CALCIUM CHLORIDE: 600; 310; 30; 20 INJECTION, SOLUTION INTRAVENOUS at 06:45

## 2021-10-14 RX ADMIN — FENTANYL CITRATE 25 MCG: 50 INJECTION, SOLUTION INTRAMUSCULAR; INTRAVENOUS at 07:38

## 2021-10-14 RX ADMIN — ACETAMINOPHEN 975 MG: 325 TABLET ORAL at 06:21

## 2021-10-14 RX ADMIN — PROPOFOL 100 MG: 10 INJECTION, EMULSION INTRAVENOUS at 07:28

## 2021-10-14 RX ADMIN — KETOROLAC TROMETHAMINE 30 MG: 30 INJECTION, SOLUTION INTRAMUSCULAR; INTRAVENOUS at 08:58

## 2021-10-14 RX ADMIN — PROPOFOL 40 MG: 10 INJECTION, EMULSION INTRAVENOUS at 08:18

## 2021-10-14 RX ADMIN — DEXAMETHASONE SODIUM PHOSPHATE 10 MG: 4 INJECTION, SOLUTION INTRA-ARTICULAR; INTRALESIONAL; INTRAMUSCULAR; INTRAVENOUS; SOFT TISSUE at 07:34

## 2021-10-14 RX ADMIN — GLYCOPYRROLATE 0.2 MG: 0.2 INJECTION, SOLUTION INTRAMUSCULAR; INTRAVENOUS at 07:18

## 2021-10-14 RX ADMIN — PROPOFOL 80 MG: 10 INJECTION, EMULSION INTRAVENOUS at 07:31

## 2021-10-14 RX ADMIN — PROPOFOL 150 MCG/KG/MIN: 10 INJECTION, EMULSION INTRAVENOUS at 07:25

## 2021-10-14 RX ADMIN — OXYCODONE HYDROCHLORIDE 5 MG: 5 TABLET ORAL at 09:36

## 2021-10-14 RX ADMIN — SCOLOPAMINE TRANSDERMAL SYSTEM 1 PATCH: 1 PATCH, EXTENDED RELEASE TRANSDERMAL at 06:51

## 2021-10-14 RX ADMIN — GABAPENTIN 300 MG: 300 CAPSULE ORAL at 06:22

## 2021-10-14 RX ADMIN — ONDANSETRON 4 MG: 2 INJECTION INTRAMUSCULAR; INTRAVENOUS at 07:36

## 2021-10-14 RX ADMIN — FENTANYL CITRATE 25 MCG: 50 INJECTION, SOLUTION INTRAMUSCULAR; INTRAVENOUS at 07:18

## 2021-10-14 RX ADMIN — LIDOCAINE HYDROCHLORIDE 80 MG: 20 INJECTION, SOLUTION INFILTRATION; PERINEURAL at 07:25

## 2021-10-14 RX ADMIN — PROPOFOL 200 MG: 10 INJECTION, EMULSION INTRAVENOUS at 07:25

## 2021-10-14 RX ADMIN — FENTANYL CITRATE 50 MCG: 50 INJECTION, SOLUTION INTRAMUSCULAR; INTRAVENOUS at 07:51

## 2021-10-14 RX ADMIN — CEFAZOLIN SODIUM 2 G: 2 INJECTION, SOLUTION INTRAVENOUS at 07:14

## 2021-10-14 ASSESSMENT — MIFFLIN-ST. JEOR: SCORE: 1420.35

## 2021-10-14 NOTE — OP NOTE
PREOPERATIVE DIAGNOSIS: Thyroid eye disease with exophthalmos, bilateral eye.   POSTOPERATIVE DIAGNOSIS: Thyroid eye disease with exophthalmos, bilateral eye.   PROCEDURE:Bilateral lateral wall bone and fat orbital decompression with intraoperative navigation.   SURGEON: Mia Hoskins MD  ASSISTANTS: None  ANESTHESIA: General with local infiltration of a 50/50 mixture of 2% lidocaine with epinephrine and 0.5% Marcaine.   COMPLICATIONS: None.   ESTIMATED BLOOD LOSS: Less than 20 mL.   SPECIMENS: None.   HISTORY AND INDICATIONS: Maia Woods  presented with severe exophthalmos secondary to thyroid eye disease with exposure keratitis. After the risks, benefits and alternatives to the proposed procedure were explained, informed consent was obtained.   DESCRIPTION OF PROCEDURE: Maia Woods  was brought to the operating room and placed supine on the operating table. Under general anesthesia with an LMA, bilateral lateral canthal areas were infiltrated with local anesthetic and she was prepped and draped in the typical sterile ophthalmic fashion. The [email or web address] navigation system was used. Her CT scan had been uploaded, and the LED face mask was applied. Registration was performed and accuracy was verified. Attention was directed to the right side. Lateral canthal incision was made with a 15 blade and dissection carried down through the orbicularis with monopolar cautery. Lateral canthotomy and superior and inferior cantholysis was performed. Dissection was carried along the orbital rim. A malleable retractor was used to protect the globe and the intraorbital contents and the periorbita elevated from the lateral orbital wall. Hemostasis was obtained with monopolar cautery. The lateral orbital wall was then removed using an ultrasonic bone aspirator, the bone removal started just inside the lateral orbital rim and extended back to the thick bone of the trigone. Extended superiorly to the lacrimal fossa and inferiorly  to the infraorbital fissure. Hemostasis was obtained with monopolar cautery and bone wax throughout this procedure. Navigation was used to verify the extent of decompression. The inferotemporal periorbita was widely opened and the intraconal fat removed using monopolar cautery. Again, hemostasis was obtained. The superior and inferior ismael of lateral canthal tendon were secured to the lateral orbital rim periosteum using a 5-0 Vicryl suture. The deep tissues were closed with interrupted buried 5-0 Vicryl suture. The skin was closed with running 6-0 plain gut suture. Ophthalmic antibiotic ointment was applied to the incision and into the eye. Attention was directed to the opposite side and the same procedure performed. The patient tolerated the procedure well. Maia Woods  left the operating room in stable condition after being awoken from the general anesthetic.   Mia Hoskins MD

## 2021-10-14 NOTE — ANESTHESIA POSTPROCEDURE EVALUATION
Patient: Maia Woods    Procedure: Procedure(s):  Bilateral lateral orbital decompression with navigation  SONOPET EYE       Diagnosis:Thyroid eye disease [E05.00]  Proptosis due to thyroid disorder [E05.00]  Diagnosis Additional Information: No value filed.    Anesthesia Type:  No value filed.    Note:  Disposition: Outpatient   Postop Pain Control: Uneventful            Sign Out: Well controlled pain   PONV: No   Neuro/Psych: Uneventful            Sign Out: Acceptable/Baseline neuro status   Airway/Respiratory: Uneventful            Sign Out: Acceptable/Baseline resp. status   CV/Hemodynamics: Uneventful            Sign Out: Acceptable CV status; No obvious hypovolemia; No obvious fluid overload   Other NRE: NONE   DID A NON-ROUTINE EVENT OCCUR? No           Last vitals:  Vitals Value Taken Time   /79 10/14/21 0944   Temp 36.6  C (97.8  F) 10/14/21 0944   Pulse 84 10/14/21 0945   Resp 10 10/14/21 0945   SpO2 100 % 10/14/21 0945   Vitals shown include unvalidated device data.    Electronically Signed By: Rickie Gipson MD  October 14, 2021  11:44 AM

## 2021-10-14 NOTE — BRIEF OP NOTE
[facility]    Brief Operative Note    Pre-operative diagnosis: Thyroid eye disease [E05.00]  Proptosis due to thyroid disorder [E05.00]  Post-operative diagnosis Same as pre-operative diagnosis    Procedure: Procedure(s):  Bilateral lateral orbital decompression with navigation  SONOPET EYE  Surgeon: Surgeon(s) and Role:     * Mia Hoskins MD - Primary  Anesthesia: General   Estimated Blood Loss: Minimal    Drains: None  Specimens: * No specimens in log *  Findings:   None.  Complications: None.  Implants: * No implants in log *

## 2021-10-14 NOTE — ANESTHESIA PREPROCEDURE EVALUATION
Anesthesia Pre-Procedure Evaluation    Patient: Maia Woods   MRN: [id number] : 1995        Preoperative Diagnosis: Thyroid eye disease [E05.00]  Proptosis due to thyroid disorder [E05.00]    Procedure : Procedure(s):  Bilateral lateral orbital decompression with navigation  SONOPET EYE          Past Medical History:   Diagnosis Date     Hyperthyroidism     Grave's disease     IBS (irritable bowel syndrome)      Migraine       Past Surgical History:   Procedure Laterality Date     AS RAD RESEC TONSIL/PILLARS       GYN SURGERY      hymenectomy     REPAIR RETRACTION LID Right 10/1/2020    Procedure: Right upper eyelid retraction repair;  Surgeon: Mia Hoskins MD;  Location: [facility] OR     THYROIDECTOMY N/A 2020    Procedure: TOTAL THYROIDECTOMY;  Surgeon: Mimi Chau MD;  Location: [facility] OR     wisdom teeth extraction        Allergies   Allergen Reactions     Sulfa Drugs Hives      Social History     Tobacco Use     Smoking status: Never Smoker     Smokeless tobacco: Never Used   Substance Use Topics     Alcohol use: Yes     Comment: occas      Wt Readings from Last 1 Encounters:   10/14/21 65.8 kg (145 lb)        Anesthesia Evaluation   Pt has had prior anesthetic.     History of anesthetic complications  - motion sickness.      ROS/MED HX  ENT/Pulmonary:       Neurologic:     (+) migraines,     Cardiovascular:       METS/Exercise Tolerance:     Hematologic:       Musculoskeletal:       GI/Hepatic:       Renal/Genitourinary:       Endo:     (+) thyroid problem,  Graves s/p thyroidectomy,     Psychiatric/Substance Use:       Infectious Disease:       Malignancy:       Other:            Physical Exam    Airway        Mallampati: II   TM distance: > 3 FB   Neck ROM: full   Mouth opening: > 3 cm    Respiratory Devices and Support         Dental  no notable dental history         Cardiovascular             Pulmonary                   OUTSIDE LABS:  CBC: No results found for: WBC, HGB, HCT,  PLT  BMP:   Lab Results   Component Value Date     06/23/2020    POTASSIUM 4.5 06/23/2020    CHLORIDE 102 06/23/2020    CO2 26 06/23/2020    BUN 8 06/23/2020    CR 0.66 06/23/2020     (H) 06/23/2020     COAGS: No results found for: PTT, INR, FIBR  POC:   Lab Results   Component Value Date    HCG Negative 10/14/2021     HEPATIC: No results found for: ALBUMIN, PROTTOTAL, ALT, AST, GGT, ALKPHOS, BILITOTAL, BILIDIRECT, HA  OTHER:   Lab Results   Component Value Date    IRLANDA 8.5 06/23/2020       Anesthesia Plan    ASA Status:  2   NPO Status:  NPO Appropriate    Anesthesia Type: General.     - Airway: LMA   Induction: Intravenous, Propofol.   Maintenance: Balanced.        Consents    Anesthesia Plan(s) and associated risks, benefits, and realistic alternatives discussed. Questions answered and patient/representative(s) expressed understanding.     - Discussed with:  Patient      - Extended Intubation/Ventilatory Support Discussed: No.      - Patient is DNR/DNI Status: No    Use of blood products discussed: No .     Postoperative Care    Pain management: IV analgesics, Oral pain medications.   PONV prophylaxis: Ondansetron (or other 5HT-3), Dexamethasone or Solumedrol, Background Propofol Infusion, Scopolamine patch     Comments:         H&P reviewed: Unable to attach H&P to encounter due to EHR limitations. H&P Update: appropriate H&P reviewed, patient examined. No interval changes since H&P (within 30 days).         Rickie Gipson MD

## 2021-10-14 NOTE — DISCHARGE INSTRUCTIONS
Post-operative Instructions  Ophthalmic Plastic and Reconstructive Surgery    Mia Hoskins M.D.     All instructions apply to the operated eye(s) or eyelid(s).    Wound care and personal care    ? Apply ice compresses 15 minutes of every hour while awake for 2 days. As long as there is no further bleeding, after two days, switch to warm water compresses for five minutes, four times a day until seen by your physician.   ? You may shower or wash your hair the day after surgery. Do not go swimming for at least 2 weeks to prevent contamination of your wounds.  ? You may go for walks and light activity is ok, but no heavy (over 15 pounds) lifting, bending or excessive straining for one week.   ? Do not apply make-up to the eyes or eyelids for 2 weeks after surgery.  ? Expect some swelling, bruising, black eye (even into the lower eyelids and cheeks). Also expect serum caking, crusting and discharge from the eye and/or incisions. A small amount of surface bleeding, and depending on the type of surgery, bleeding from the inside of the eyelid, is normal for the first 48 hours.  ? Avoid straining, bending at the waist, or lifting more than 15 pounds for 10 days. Activities that raise your blood pressure can worsen swelling, cause bleeding, and breaking of sutures. Like wise, sleeping with your head slightly elevated for the first several days can help swelling resolve more quickly.   ? Do continue to ambulate (walk) as you normally would - being sedentary after surgery can cause blood clots.   ? Your eye(s) and eyelid(s) may be painful and tender. This is normal after surgery.      Contact information and follow-up  ? Return to the Eye Clinic for a follow-up appointment with your physician as scheduled. If no appointment has been scheduled:   - [facility] eye clinic: [phone] for an appointment with Dr. Hoskins within 2 to 3 weeks from your date of surgery.      -  [facility] eye clinic:  [phone] for an appointment with Dr. Hoskins within 2 to 3 weeks from your date of surgery.   ? Only if you are scheduled for a phone or video visit for your first postoperative appointment, please e-mail pictures to [email or web address] 1-2 days before your appointment. If your visit is in person, you do not need to email photos.     ? For severe pain, bleeding, or loss of vision, call the [facility] at [phone] or [facility] at [phone].     After hours or on weekends and holidays, call [phone] and ask to speak with the ophthalmologist on call.    An on call person can be reached after hours for concerns. The on call doctor should not call in medication refill requests after hours or on weekends, so please plan accordingly. An effort has been made to provide adequate pain medications following every surgery, and refills will not be provided in most instances.     Activity restrictions and driving  ? Avoid heavy lifting, bending, exercise or strenuous activity for 1 week after surgery.  You may resume other activities and return to work as tolerated.  ? You may not resume driving if you are using narcotic pain medications (such as Oxycodone, Norco, Percocet, Tylenol #3).    Medications  ? Restart all regular home medications and eye drops. If you take Plavix or  Aspirin on a regular basis, wait for 72 hours after your surgery before restarting these in order to decrease the risk of bleeding complications.  ? Avoid aspirin and aspirin-like medications (Motrin, Aleve, Ibuprofen, Olena-Elba etc) for 72 hours to reduce the risk of bleeding. You may take Tylenol (acetaminophen) for pain.  ? In addition to your home medications, take the following post-operative medications as prescribed by your physician.    ? Apply antibiotic ointment to all sutures three times a day, and into the operated eye(s) at night.  ? Instill eye drops 3 times a day for 10 days.    ? In many cases, postoperative discomfort can be managed with Tylenol alone. If narcotic pain medication was prescribed, take pain pills at prescribed frequency as needed for pain.    ? WARNING:   ? Prescribed narcotic medications may make you drowsy. You must not drive a car, operate heavy machinery or drink alcohol while taking them.  ? Prescribed narcotic pain medications may cause constipation and nausea. Take them with some food to prevent a stomach upset.      [facility]  Home Care Following Anesthesia  For 24 hours after surgery:  1. Get plenty of rest.  A responsible adult must stay with you for at least 24 hours after you leave the surgery center.  2. Do not drive or use heavy equipment.  If you have weakness or tingling, don't drive or use heavy equipment until this feeling goes away.   3. Do not drink alcohol.   4. Avoid strenuous or risky activities.  Ask for help when climbing stairs.  5. You may feel lightheaded.  IF so, sit for a few minutes before standing.  Have someone help you get up.   6. If you have nausea (feel sick to your stomach): Drink only clear liquids such as apple juice, ginger ale, broth or 7-Up.  Rest may also help.  Be sure to drink enough fluids.  Move to a regular diet as you feel able.   7. You may have a slight fever.  Call the doctor if your fever is over 100 F (37.7 C) (taken under the tongue) or lasts longer than 24 hours.  8. You may have a dry mouth, a sore throat, muscle aches or trouble sleeping. These should go away after 24 hours.  9. Do not make important or legal decisions.   10. It is recommended to avoid smoking.               Tips for taking pain medications  To get the best pain relief possible, remember these points:    Take pain medications as directed, before pain becomes severe.    Pain medication can upset your stomach: taking it with food may help.    Constipation is a common side effect of pain medication. Drink plenty  of  fluids.    Eat foods high in fiber. Take a stool softener if recommended by your doctor or pharmacist.    Do not drink alcohol, drive or operate machinery while taking pain medications.    Ask about other ways to control pain, such as with heat, ice or relaxation.    Tylenol/Acetaminophen Consumption  To help encourage the safe use of acetaminophen, the makers of TYLENOL  have lowered the maximum daily dose for single-ingredient Extra Strength TYLENOL  (acetaminophen) products sold in the [place] from 8 pills per day (4,000 mg) to 6 pills per day (3,000 mg). The dosing interval has also changed from 2 pills every 4-6 hours to 2 pills every 6 hours.    If you feel your pain relief is insufficient, you may take Tylenol/Acetaminophen in addition to your narcotic pain medication.     Be careful not to exceed 3,000 mg of Tylenol/Acetaminophen in a 24 hour period from all sources.    If you are taking extra strength Tylenol/acetaminophen (500 mg), the maximum dose is 6 tablets in 24 hours.    If you are taking regular strength acetaminophen (325 mg), the maximum dose is 9 tablets in 24 hours.    Tylenol 975mg given at 6:21am. Next dose available after 12:30pm. Then follow package instructions.     Call a doctor for any of the followin. Signs of infection (fever, growing tenderness at the surgery site, a large amount of drainage or bleeding, severe pain, foul-smelling drainage, redness, swelling).  2. It has been over 8 to 10 hours since surgery and you are still not able to urinate (pass water).  3. Headache for over 24 hours.  4. Numbness, tingling or weakness the day after surgery (if you had spinal anesthesia).  5. Signs of Covid-19 infection (temperature over 100 degrees, shortness of breath, cough, loss of taste/smell, generalized body aches, persistent headache, chills, sore throat, nausea/vomiting/diarrhea)  Your doctor is:       Dr. Mia Hoskins, Ophthalmology: [phone]               Or dial  [phone] and ask for the resident on call for:  Ophthalmology  For emergency care, call the:  [facility]:  [phone] (TTY for hearing impaired: [phone])  Scopolamine Patch- (Absorbed through the skin)    This medicine prevents nausea and vomiting caused by motion sickness or anesthesia.  The medicine is in a patch worn behind the ear.      Do NOT use the Scopolamine Patch if you have glaucoma or are allergic to scopolamine.    How to Use This Medicine:    The patch is applied behind the ear.    Keep the patch dry to prevent it from falling off.  Limit contact with water (no bathing or swimming).      If the patch is loose or falls off throw it away.  You do not need to apply a new patch.    After you take off the patch or if it falls off, wash your hands and the area behind your ear with soap and water.      You can remove the patch tomorrow, or leave on for up to 3 days.    Only one patch should be used at any time.    How to Dispose of This Medicine:    Fold the used patch in half with the sticky sides together. Throw any used patch away so that children or pets cannot get to it. You will also need to throw away old patches after the expiration date has passed.    Keep all medicine away from children and never share your medicine with anyone.    Warnings While Using This Medicine:    This medicine can make you sleepy.  Avoid taking sleeping pills and other medicines that can make you sleepy while the patch is on.    Do not drink alcohol while the patch is on.    This medicine can cause temporary blurring and other vision problems if it comes in contact with the eyes.  This is not serious unless accompanied by eye pain and redness.     This medicine may cause problems with urination. If you have problems with urinating, remove the patch.  If you are unable to urinate, call your doctor.      This medicine may make you dizzy or drowsy. Avoid driving, using machines, or doing anything  else that could be dangerous if the patch is on.    This medicine may make you sweat less and cause your body to get too hot. Be careful in hot weather or if you are exercising.    Make sure any doctor or dentist who treats you knows that you have the patch on. This medicine may affect the results of certain medical tests.    Skin burns have been reported at the patch site in several patients wearing an aluminized transdermal system during a magnetic resonance imaging scan (MRI).  Since this patch contains aluminum, it is recommended to remove the patch if you are having an MRI.    Possible Side Effects While Using This Medicine:    Dry mouth    Drowsiness    Temporary blurring of vision and widening of the pupils    Call your doctor right away if you notice any of these side effects:    Allergic reaction: Itching or hives, swelling in your face or hands, swelling or tingling in your mouth or throat, chest tightness, trouble breathing.    Blurred vision that does not go away after the patch is removed    Confusion or memory loss    Fast,slow, or uneven heartbeat    Lightheadedness, dizziness, drowsiness, or fainting    Seeing, hearing, or feeling things that are not there    Restlessness    Severe eye pain    Trouble urinating    If you notice other side effects that you think are caused by this medicine, call your doctor immediately.

## 2021-10-14 NOTE — ANESTHESIA CARE TRANSFER NOTE
Patient: Maia Woods    Procedure: Procedure(s):  Bilateral lateral orbital decompression with navigation  SONOPET EYE       Diagnosis: Thyroid eye disease [E05.00]  Proptosis due to thyroid disorder [E05.00]  Diagnosis Additional Information: No value filed.    Anesthesia Type:   No value filed.     Note:    Oropharynx: spontaneously breathing  Level of Consciousness: drowsy  Oxygen Supplementation: face mask    Independent Airway: airway patency satisfactory and stable  Dentition: dentition unchanged  Vital Signs Stable: post-procedure vital signs reviewed and stable  Report to RN Given: handoff report given  Patient transferred to: PACU    Handoff Report: Identifed the Patient, Identified the Reponsible Provider, Reviewed the pertinent medical history, Discussed the surgical course, Reviewed Intra-OP anesthesia mangement and issues during anesthesia, Set expectations for post-procedure period and Allowed opportunity for questions and acknowledgement of understanding      Vitals:  Vitals Value Taken Time   /77 10/14/21 0918   Temp 97.8    Pulse 77 10/14/21 0920   Resp 9 10/14/21 0920   SpO2 100 % 10/14/21 0920   Vitals shown include unvalidated device data.    Electronically Signed By: JORGE Martinez CRNA  October 14, 2021  9:22 AM

## 2021-10-26 ENCOUNTER — OFFICE VISIT (OUTPATIENT)
Dept: OPHTHALMOLOGY | Facility: CLINIC | Age: 26
End: 2021-10-26
Payer: COMMERCIAL

## 2021-10-26 DIAGNOSIS — E07.9 THYROID EYE DISEASE: ICD-10-CM

## 2021-10-26 DIAGNOSIS — H57.89 THYROID EYE DISEASE: ICD-10-CM

## 2021-10-26 DIAGNOSIS — E05.00 PROPTOSIS DUE TO THYROID DISORDER: ICD-10-CM

## 2021-10-26 DIAGNOSIS — Z48.89 ENCOUNTER FOR POSTOPERATIVE CARE: Primary | ICD-10-CM

## 2021-10-26 PROCEDURE — 99024 POSTOP FOLLOW-UP VISIT: CPT | Mod: GC | Performed by: OPHTHALMOLOGY

## 2021-10-26 ASSESSMENT — SLIT LAMP EXAM - LIDS
COMMENTS: NORMAL WELL HEALED
COMMENTS: NORMAL

## 2021-10-26 ASSESSMENT — VISUAL ACUITY
OS_SC+: -1
OD_SC: 20/20
METHOD: SNELLEN - LINEAR
OS_SC: 20/20

## 2021-10-26 ASSESSMENT — EXTERNAL EXAM - RIGHT EYE: OD_EXAM: NORMAL

## 2021-10-26 ASSESSMENT — TONOMETRY
OD_IOP_MMHG: 21
OS_IOP_MMHG: 21
IOP_METHOD: ICARE

## 2021-10-26 NOTE — PROGRESS NOTES
Chief Complaint(s) and History of Present Illness(es)     Follow Up     Laterality: both eyes    Associated symptoms: Negative for eye pain    Treatments tried: no treatments    Pain scale: 0/10    Comments: Bilateral lateral orbital decompression with intraoperative   navigation 10/14/21              Comments     Having no pain following procedure just aware of each eye like its   healing. Some tightness in the peripheral corners of each eye.   Some blood discharge and tearing though out the weekend after procedure   has since resolved. Vision each eye is a little blurry since the   procedure. No issues with double vision each eye.   EES each eye ointment used just night began doing this 5 days ago due to   the blurry vision with use.   Maxitrol gtts TID each eye. This am.     Shahrzad Lomax, COT COT 7:53 AM October 26, 2021                   Assessment & Plan     Maia Woods is a 26 year old female with the following diagnoses:   Encounter Diagnoses   Name Primary?     Thyroid eye disease      Proptosis due to thyroid disorder      Encounter for postoperative care Yes     - Doing well overall, some intermittent blurry vision that improves as the day goes on  - Continue erythromycin ointment until it runs out on outer corner.   - Continue artificial tears QID PRN   Warm comrpesses    Patient disposition:   Return in about 2 months (around 12/26/2021).        Attending Physician Attestation: Complete documentation of historical and exam elements from today's encounter can be found in the full encounter summary report (not reduplicated in this progress note). I personally obtained the chief complaint(s) and history of present illness. I confirmed and edited as necessary the review of systems, past medical/surgical history, family history, social history, and examination findings as documented by others; and I examined the patient myself. I personally reviewed the relevant tests, images, and reports as  documented above. I formulated and edited as necessary the assessment and plan and discussed the findings and management plan with the patient.  -Mia Hoskins MD

## 2021-10-26 NOTE — NURSING NOTE
Chief Complaints and History of Present Illnesses   Patient presents with     Follow Up     Bilateral lateral orbital decompression with intraoperative navigation 10/14/21     Chief Complaint(s) and History of Present Illness(es)     Follow Up     Laterality: both eyes    Associated symptoms: Negative for eye pain    Treatments tried: no treatments    Pain scale: 0/10    Comments: Bilateral lateral orbital decompression with intraoperative navigation 10/14/21              Comments     Having no pain following procedure just aware of each eye like its healing. Some tightness in the peripheral corners of each eye.   Some blood discharge and tearing though out the weekend after procedure has since resolved. Vision each eye is a little blurry since the procedure. No issues with double vision each eye.   EES each eye ointment used just night began doing this 5 days ago due to the blurry vision with use.   Maxitrol gtts TID each eye. This am.     ADÁN Leach COT 7:53 AM October 26, 2021

## 2021-12-21 ENCOUNTER — OFFICE VISIT (OUTPATIENT)
Dept: OPHTHALMOLOGY | Facility: CLINIC | Age: 26
End: 2021-12-21
Payer: COMMERCIAL

## 2021-12-21 DIAGNOSIS — H57.89 THYROID EYE DISEASE: Primary | ICD-10-CM

## 2021-12-21 DIAGNOSIS — Z48.89 ENCOUNTER FOR POSTOPERATIVE CARE: ICD-10-CM

## 2021-12-21 DIAGNOSIS — E05.00 PROPTOSIS DUE TO THYROID DISORDER: ICD-10-CM

## 2021-12-21 DIAGNOSIS — E07.9 THYROID EYE DISEASE: Primary | ICD-10-CM

## 2021-12-21 PROCEDURE — 99024 POSTOP FOLLOW-UP VISIT: CPT | Mod: GC | Performed by: OPHTHALMOLOGY

## 2021-12-21 RX ORDER — LEVOTHYROXINE SODIUM 125 UG/1
TABLET ORAL
COMMUNITY
Start: 2021-11-09

## 2021-12-21 ASSESSMENT — EXTERNAL EXAM - RIGHT EYE: OD_EXAM: NORMAL

## 2021-12-21 ASSESSMENT — SLIT LAMP EXAM - LIDS
COMMENTS: MILD UPPER LID RETRACTION
COMMENTS: NORMAL WELL HEALED

## 2021-12-21 ASSESSMENT — MARGIN REFLEX DISTANCE
OS_MRD2: 6
OD_MRD2: 5

## 2021-12-21 ASSESSMENT — TONOMETRY
OS_IOP_MMHG: 18
OD_IOP_MMHG: 18
IOP_METHOD: ICARE

## 2021-12-21 ASSESSMENT — VISUAL ACUITY
OD_SC: 20/20
METHOD: SNELLEN - LINEAR
OS_SC: 20/20

## 2021-12-21 NOTE — PROGRESS NOTES
Chief Complaint(s) and History of Present Illness(es)     Follow Up     Laterality: both eyes    Associated symptoms: dryness.  Negative for eye pain, redness, tearing   and discharge    Treatments tried: artificial tears    Pain scale: 0/10    Comments: S/P Bilateral lateral orbital decompression with navigation   10/14/21          Comments     Patient reports that thing are going well post procedure. Vision each eye   is not as blurry and longer with each eye but double vision still remains.     Noticed with looking to either side or in the upward gaze. Closing either   eye image does fuze.     Shahrzad Dami, COT COT 8:37 AM December 21, 2021       Patient is status post bilateral lateral wall bone and fat orbital decompression with intraoperative navigation on 10/14/21, now over 2 months out from surgery. She is noting diplopia on gaze to the right, left and upwards, but not in primary gaze. No eye pain. She notes tearing infrequently. Not noticing redness or swelling. She is using artificial tears about once or once every other day. Not doing any other treatments currently.        Assessment & Plan     Maia Woods is a 26 year old female with the following diagnoses:   Encounter Diagnoses   Name Primary?     Thyroid eye disease Yes     Proptosis due to thyroid disorder      Encounter for postoperative care      Status post bilateral lateral orbital wall bone and fat orbital decompression on 10/14/2021. Continues to have diplopia when looking to the left, right, or upwards, no diplopia in primary gaze. Left eye with lid retraction.     PLAN:  - Continue lubricating treatments      Taco Boyce MD  Resident Physician, PGY-2  Ophthalmology  Overall doing well post bilateral lateral orbital decompression. Kena improved 4.5 mm bilaterally. Incisions healing nicely.   Several weeks after surgery began to notice diplopia in upgaze and right gaze. She is unsure if it was present immediately after surgery  as her vision was a bit blurry. She has a small left hypo in upgaze, which was also noticed on preop strab measurements, but also a new larger left hypo on far right gaze.   We discussed this could be post surgical, or it is possible that it could indicate active thyroid eye disease.   I recommend observation at this point. Warm compresses, and artificial tears. Sunscreen on incision if in the sun.  F/u with Dr. Zuñiga in about 2 months, then return to see me once ready to address eyelids.            Attending Physician Attestation:   Complete documentation of historical and exam elements from today's encounter can be found in the full encounter summary report (not reduplicated in this progress note). I personally obtained the chief complaint(s) and history of present illness. I confirmed and edited as necessary the review of systems, past medical/surgical history, family history, social history, and examination findings as documented by others; and I examined the patient myself. I personally reviewed the relevant tests, images, and reports as documented above. I formulated and edited as necessary the assessment and plan and discussed the findings and management plan with the patient.    Mia Hoskins MD

## 2021-12-21 NOTE — NURSING NOTE
Chief Complaints and History of Present Illnesses   Patient presents with     Follow Up     S/P Bilateral lateral orbital decompression with navigation 10/14/21     Chief Complaint(s) and History of Present Illness(es)     Follow Up     Laterality: both eyes    Associated symptoms: dryness.  Negative for eye pain, redness, tearing and discharge    Treatments tried: artificial tears    Pain scale: 0/10    Comments: S/P Bilateral lateral orbital decompression with navigation 10/14/21              Comments     Patient reports that thing are going well post procedure. Vision each eye is not as blurry and longer with each eye but double vision still remains.   Noticed with looking to either side or in the upward gaze. Closing either eye image does fuze.     Shahrzad Lomax, COT COT 8:37 AM December 21, 2021

## 2022-01-30 ENCOUNTER — HEALTH MAINTENANCE LETTER (OUTPATIENT)
Age: 27
End: 2022-01-30

## 2022-03-08 ENCOUNTER — OFFICE VISIT (OUTPATIENT)
Dept: OPHTHALMOLOGY | Facility: CLINIC | Age: 27
End: 2022-03-08
Attending: OPHTHALMOLOGY
Payer: COMMERCIAL

## 2022-03-08 DIAGNOSIS — H50.22 HYPOTROPIA OF LEFT EYE: Primary | ICD-10-CM

## 2022-03-08 DIAGNOSIS — H53.2 DOUBLE VISION: ICD-10-CM

## 2022-03-08 DIAGNOSIS — H50.012 ESOTROPIA, LEFT EYE: ICD-10-CM

## 2022-03-08 DIAGNOSIS — H53.10 SUBJECTIVE VISUAL DISTURBANCE: ICD-10-CM

## 2022-03-08 PROCEDURE — 92060 SENSORIMOTOR EXAMINATION: CPT | Performed by: OPHTHALMOLOGY

## 2022-03-08 PROCEDURE — G0463 HOSPITAL OUTPT CLINIC VISIT: HCPCS | Mod: 25

## 2022-03-08 PROCEDURE — 92012 INTRM OPH EXAM EST PATIENT: CPT | Performed by: OPHTHALMOLOGY

## 2022-03-08 ASSESSMENT — CUP TO DISC RATIO
OS_RATIO: 0.3
OD_RATIO: 0.3

## 2022-03-08 ASSESSMENT — SLIT LAMP EXAM - LIDS
COMMENTS: MILD UPPER LID RETRACTION
COMMENTS: NORMAL WELL HEALED

## 2022-03-08 ASSESSMENT — VISUAL ACUITY
OD_CC: 20/20
METHOD: SNELLEN - LINEAR
OS_CC: 20/20

## 2022-03-08 ASSESSMENT — TONOMETRY
OS_IOP_MMHG: 18
OD_IOP_MMHG: 18
IOP_METHOD: ICARE

## 2022-03-08 ASSESSMENT — EXTERNAL EXAM - RIGHT EYE: OD_EXAM: NORMAL

## 2022-03-08 NOTE — LETTER
"2022    RE: Maia Woods  : 1995  MRN: [id number]    Dear Providers,    I saw our mutual patient, Maia Woods, in follow-up in my clinic recently.  After a thorough neuro-ophthalmic history and examination, I came to the following conclusions:      1. 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  Diplopia acute onset after orbital decompression. Mild residual pattern today of left inferior oblique paresis with left hypotropia in up gaze and greater in right up gaze.  Based upon history, there has been considerable improvement in diplopia over time since decompression. Today her residual diplopia is very mild and only in far up right gaze. Discussed that prisms or strabismus surgery which are aimed at establishing single binocular vision in and around primary gaze will not be helpful in her case and would put her at risk for causing diplopia in primary gaze. She is not particularly bothered by her diplopia at this point and is likewise not interested in strabismus surgery or prism glasses.    Advised patient to follow-up with Dr. Hoskins for eyelid surgery evaluation as strabismus surgery is not indicated.    Interim history and exam with me since last visit 21    Patient reports noticing diplopia once her vision cleared up after decompression.  Since noticing diplopia has improved overall significantly but she still has it looking up and to the right.     S/P Bilateral lateral orbital decompression with navigation 10/14/21     Last visit with Dr. Hoskins 21  \"Overall doing well post bilateral lateral orbital decompression. Kena improved 4.5 mm bilaterally. Incisions healing nicely.   Several weeks after surgery began to notice diplopia in upgaze and right gaze. She is unsure if it was present immediately after surgery as her vision was a bit blurry. She " "has a small left hypo in upgaze, which was also noticed on preop strab measurements, but also a new larger left hypo on far right gaze.   We discussed this could be post surgical, or it is possible that it could indicate active thyroid eye disease.   I recommend observation at this point. Warm compresses, and artificial tears. Sunscreen on incision if in the sun.  F/u with Dr. Zuñiga in about 2 months, then return to see me once ready to address eyelids.\"      Exam today showed normal afferent visual function in both eyes.  Sensorimotor exam showed orthophoria in primary gaze, down gaze.  In up gaze there is a 1 prism diopter left hypotropia and in up right gaze there is a left hypotropia of 5 prism diopters.  Motility is full in both eyes- no clear elevation deficit in adduction in the left eye (though there may be a trace deficit based upon alternate cover testing).          For further exam details, please feel free to contact our office for additional records.  If you wish to contact me regarding this patient please email me at [email or web address] or give my clinic a call to arrange a phone conversation.    Sincerely,    John Paul Zuñiga MD  , Neuro-Ophthalmology and Adult Strabismus Surgery  [employer] [facility] [employer] in Neuro-Ophthalmology  Department of Ophthalmology and Visual Neurosciences  [facility]      "

## 2022-03-08 NOTE — PROGRESS NOTES
"      1. 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  Diplopia acute onset after orbital decompression. Mild residual pattern today of left inferior oblique paresis with left hypotropia in up gaze and greater in right up gaze.  Based upon history, there has been considerable improvement in diplopia over time since decompression. Today her residual diplopia is very mild and only in far up right gaze. Discussed that prisms or strabismus surgery which are aimed at establishing single binocular vision in and around primary gaze will not be helpful in her case and would put her at risk for causing diplopia in primary gaze. She is not particularly bothered by her diplopia at this point and is likewise not interested in strabismus surgery or prism glasses.    Advised patient to follow-up with Dr. Hoskins for eyelid surgery evaluation as strabismus surgery is not indicated.    Interim history and exam with me since last visit 9/7/21    Patient reports noticing diplopia once her vision cleared up after decompression.  Since noticing diplopia has improved overall significantly but she still has it looking up and to the right.     S/P Bilateral lateral orbital decompression with navigation 10/14/21     Last visit with Dr. Hoskins 12/21/21  \"Overall doing well post bilateral lateral orbital decompression. Kena improved 4.5 mm bilaterally. Incisions healing nicely.   Several weeks after surgery began to notice diplopia in upgaze and right gaze. She is unsure if it was present immediately after surgery as her vision was a bit blurry. She has a small left hypo in upgaze, which was also noticed on preop strab measurements, but also a new larger left hypo on far right gaze.   We discussed this could be post surgical, or it is possible that it could indicate active thyroid eye disease.   I recommend " "observation at this point. Warm compresses, and artificial tears. Sunscreen on incision if in the sun.  F/u with Dr. Zuñiga in about 2 months, then return to see me once ready to address eyelids.\"      Exam today showed normal afferent visual function in both eyes.  Sensorimotor exam showed orthophoria in primary gaze, down gaze.  In up gaze there is a 1 prism diopter left hypotropia and in up right gaze there is a left hypotropia of 5 prism diopters.  Motility is full in both eyes- no clear elevation deficit in adduction in the left eye (though there may be a trace deficit based upon alternate cover testing).           Complete documentation of historical and exam elements from today's encounter can be found in the full encounter summary report (not reduplicated in this progress note).  I personally obtained the chief complaint(s) and history of present illness.  I confirmed and edited as necessary the review of systems, past medical/surgical history, family history, social history, and examination findings as documented by others; and I examined the patient myself.  I personally reviewed the relevant tests, images, and reports as documented above.  I formulated and edited as necessary the assessment and plan and discussed the findings and management plan with the patient and family     John Paul Zuñiga MD        "

## 2022-03-08 NOTE — PROGRESS NOTES
1.     Maia Woods is a pleasant 26 year old White female who presents to my neuro-ophthalmology clinic today     HPI:    The patient is presenting with a chronic illness with severe exacerbation or progression.    Independent historians:  Patient     Review of outside testing:      My interpretation performed today of outside testing:        Review of outside clinical notes:  6/2021 thyroid eye disease clinic notes:  Maia Woods is a 24 year old F who presents for follow up of thyroid eye disease. Stable subjectively since last visit since march 2021. She is a never smoker. She is taking selenium 200 micrograms daily.  Now s/p total thyroidectomy 6/22/20 and doing well since surgery.  No significant changes in symptoms since then. Status post right upper eyelid retraction repair in October 2020. Eye symptoms started in January 2020.     Thyroid history:  Diagnosed when? 11/2019   ARSHAD: N/A  Thyroidectomy: 6/22/2020     TSI (date):  4.6 8/25/21     Previous results: 7.2 from 3/3/21    Eye symptoms (since when):   Proptosis (better/worse/same since last visit): Same - right eye  Diplopia (better/worse/same since last visit): NA  Eyelid retraction(better/worse/same since last visit): none  Tearing(better/worse/same since last visit): none  Redness (better/worse/same since last visit):none  Pain ((better/worse/same since last visit):none  Pain to move the eyes (better/worse/same since last visit): none  Blurred vision: none    Ocular history:  Orbital decompression (date, details): 10/14/21 Bilateral lateral wall bone and fat orbital decompression with intraoperative navigation with Dr. Hoskins  Strabismus surgery (date, details): N/A  Eyelid surgery (date, details):  right upper eyelid retraction repair in October 2020    Exam:   Kena (base):100   Better/worse same: same  Strabismus (better/worse/same): same  Eyelid retraction (better/worse/same): ptosis right eyelid    Past medical history:      Family  history / social history:      Exam:      Tests ordered and interpreted today:      Discussion of management / interpretation with another provider:             Complete documentation of historical and exam elements from today's encounter can be found in the full encounter summary report (not reduplicated in this progress note).  I personally obtained the chief complaint(s) and history of present illness.  I confirmed and edited as necessary the review of systems, past medical/surgical history, family history, social history, and examination findings as documented by others; and I examined the patient myself.  I personally reviewed the relevant tests, images, and reports as documented above.  I formulated and edited as necessary the assessment and plan and discussed the findings and management plan with the patient and family     John Paul Zuñiga MD

## 2022-03-08 NOTE — NURSING NOTE
Chief Complaint(s) and History of Present Illness(es)     Thyroid Disease     Laterality: both eyes    Associated symptoms: Negative for eye pain, redness and tearing              Comments     Notices a little bit of diplopia in side gazes, but not bothersome.  S/P Bilateral lateral orbital decompression with navigation   10/14/21

## 2022-03-15 ENCOUNTER — OFFICE VISIT (OUTPATIENT)
Dept: OPHTHALMOLOGY | Facility: CLINIC | Age: 27
End: 2022-03-15
Payer: COMMERCIAL

## 2022-03-15 ENCOUNTER — TELEPHONE (OUTPATIENT)
Dept: OPHTHALMOLOGY | Facility: CLINIC | Age: 27
End: 2022-03-15

## 2022-03-15 DIAGNOSIS — H02.401 ACQUIRED INVOLUTIONAL PTOSIS OF RIGHT EYELID: ICD-10-CM

## 2022-03-15 DIAGNOSIS — H02.539 EYELID RETRACTION OR LAG: ICD-10-CM

## 2022-03-15 DIAGNOSIS — E05.00 GRAVES DISEASE: Primary | ICD-10-CM

## 2022-03-15 DIAGNOSIS — E07.9 THYROID EYE DISEASE: ICD-10-CM

## 2022-03-15 DIAGNOSIS — H57.89 THYROID EYE DISEASE: ICD-10-CM

## 2022-03-15 DIAGNOSIS — Z11.59 ENCOUNTER FOR SCREENING FOR OTHER VIRAL DISEASES: Primary | ICD-10-CM

## 2022-03-15 PROCEDURE — 99214 OFFICE O/P EST MOD 30 MIN: CPT | Mod: GC | Performed by: OPHTHALMOLOGY

## 2022-03-15 ASSESSMENT — VISUAL ACUITY
OS_SC: 20/20
METHOD: SNELLEN - LINEAR
OD_SC: 20/20

## 2022-03-15 ASSESSMENT — TONOMETRY
IOP_METHOD: ICARE
OD_IOP_MMHG: 17
OS_IOP_MMHG: 17

## 2022-03-15 ASSESSMENT — EXTERNAL EXAM - RIGHT EYE: OD_EXAM: NORMAL

## 2022-03-15 ASSESSMENT — CONF VISUAL FIELD
OS_NORMAL: 1
METHOD: COUNTING FINGERS
OD_NORMAL: 1

## 2022-03-15 ASSESSMENT — MARGIN REFLEX DISTANCE
OS_MRD1: 5
OD_MRD1: 2

## 2022-03-15 NOTE — TELEPHONE ENCOUNTER
Met with patient to schedule surgery with Dr. Ortega    Surgery was scheduled on 3/31 at [facility]  Patient will have H&P at [facility]     Patient is aware a COVID-19 test is needed before their procedure. The test should be with-in 4 days of their procedure.   Test Details: Date 3/28 Location [facility]    Post-Op visit was scheduled on 4/12  Patient is aware a / is needed day of surgery.   Surgery packet was given 3/15, patient has my direct contact information for any further questions.

## 2022-03-15 NOTE — PROGRESS NOTES
Chief Complaint(s) and History of Present Illness(es)     Follow Up     Laterality: both eyes    Onset: unknown    Onset: years ago    Course: gradually improving    Associated symptoms: double vision.  Negative for eye pain, dryness,   tearing, flashes, floaters, photophobia and foreign body sensation    Treatments tried: no treatments    Pain scale: 0/10    Comments: Hypotropia of left eye.  Thyroid eye disease.              Comments     Pt states vision is better since last visit.  No ocular medications.  Some double vision when looking up and to the right.    Mateus Hines, ADÁN March 15, 2022 2:25 PM      Right upper lid seems to obscure vision when reading/driving, and left upper eyelid pulls up when she smiles and you can see the white above her eye         Superior visual field limited to 20 degrees on the right and improves to 40 degrees with manual elevatino    Assessment & Plan     Maia Woods is a 26 year old female with the following diagnoses:   Encounter Diagnoses   Name Primary?     Graves disease Yes     Thyroid eye disease      Eyelid retraction or lag      Acquired involutional ptosis of right eyelid         - S/p right upper lid retraction repair 10/1/2020, reactivation of LINDSEY  - S/p bilateral lateral orbital wall bone and fat orbital decompression on 10/14/2021 complicated by mild left inferior oblique paresis with left hypotropia though significant improved now.   - notes significant improvement in diplopia since last visit, now only notices it up/right gaze. saw Dr. Zuñiga last week who recommends observation    - lateral canthal incisions well healed    Left upper lid retraction  - pt interested in surgical correction. Ok to proceed with lid surgery given no plans for strabismus surgery    Tia Yang MD  Oculoplastics Fellow    Has ptosis right eye and mild left upper lid retraction. We discuss options.   I think a component of her left upper eyelid retraction is herring  effect from the right upper eyelid ptosis.   Plan: Right upper eyelid ptosis repair (had prior mullerectomy) small skin incision, levator advancement.  Attending Physician Attestation: Complete documentation of historical and exam elements from today's encounter can be found in the full encounter summary report (not reduplicated in this progress note). I personally obtained the chief complaint(s) and history of present illness. I confirmed and edited as necessary the review of systems, past medical/surgical history, family history, social history, and examination findings as documented by others; and I examined the patient myself. I personally reviewed the relevant tests, images, and reports as documented above. I formulated and edited as necessary the assessment and plan and discussed the findings and management plan with the patient.  -Mia Hoskins MD      Today with Maia Woods, I reviewed the indications, risks, benefits, and alternatives of the proposed surgical procedure including, but not limited to, failure obtain the desired result  and need for additional surgery, bleeding, infection, injury to the eye, and the remote possibility of permanent damage to any organ system or death with the use of anesthesia.  I provided multiple opportunities for the questions, answered all questions to the best of my ability, and confirmed that my answers and my discussion were understood.   Mia Hoskins MD

## 2022-03-15 NOTE — NURSING NOTE
Chief Complaints and History of Present Illnesses   Patient presents with     Follow Up     Hypotropia of left eye.  Thyroid eye disease.     Chief Complaint(s) and History of Present Illness(es)     Follow Up     Laterality: both eyes    Onset: unknown    Onset: years ago    Course: gradually improving    Associated symptoms: double vision.  Negative for eye pain, dryness, tearing, flashes, floaters, photophobia and foreign body sensation    Treatments tried: no treatments    Pain scale: 0/10    Comments: Hypotropia of left eye.  Thyroid eye disease.              Comments     Pt states vision is better since last visit.  No ocular medications.  Some double vision when looking up and to the right.    ADÁN Lucas March 15, 2022 2:25 PM

## 2022-03-28 ENCOUNTER — LAB (OUTPATIENT)
Dept: LAB | Facility: CLINIC | Age: 27
End: 2022-03-28
Payer: COMMERCIAL

## 2022-03-28 DIAGNOSIS — Z11.59 ENCOUNTER FOR SCREENING FOR OTHER VIRAL DISEASES: ICD-10-CM

## 2022-03-28 PROCEDURE — U0005 INFEC AGEN DETEC AMPLI PROBE: HCPCS

## 2022-03-28 PROCEDURE — U0003 INFECTIOUS AGENT DETECTION BY NUCLEIC ACID (DNA OR RNA); SEVERE ACUTE RESPIRATORY SYNDROME CORONAVIRUS 2 (SARS-COV-2) (CORONAVIRUS DISEASE [COVID-19]), AMPLIFIED PROBE TECHNIQUE, MAKING USE OF HIGH THROUGHPUT TECHNOLOGIES AS DESCRIBED BY CMS-2020-01-R: HCPCS

## 2022-03-29 LAB — SARS-COV-2 RNA RESP QL NAA+PROBE: NEGATIVE

## 2022-03-30 ENCOUNTER — ANESTHESIA EVENT (OUTPATIENT)
Dept: SURGERY | Facility: AMBULATORY SURGERY CENTER | Age: 27
End: 2022-03-30
Payer: COMMERCIAL

## 2022-03-31 ENCOUNTER — HOSPITAL ENCOUNTER (OUTPATIENT)
Facility: AMBULATORY SURGERY CENTER | Age: 27
Discharge: HOME | End: 2022-03-31
Attending: OPHTHALMOLOGY
Payer: COMMERCIAL

## 2022-03-31 ENCOUNTER — ANESTHESIA (OUTPATIENT)
Dept: SURGERY | Facility: AMBULATORY SURGERY CENTER | Age: 27
End: 2022-03-31
Payer: COMMERCIAL

## 2022-03-31 VITALS
WEIGHT: 145 LBS | OXYGEN SATURATION: 100 % | SYSTOLIC BLOOD PRESSURE: 120 MMHG | BODY MASS INDEX: 22.76 KG/M2 | RESPIRATION RATE: 16 BRPM | TEMPERATURE: 97.8 F | HEART RATE: 63 BPM | DIASTOLIC BLOOD PRESSURE: 78 MMHG | HEIGHT: 67 IN

## 2022-03-31 DIAGNOSIS — H02.401 PTOSIS, RIGHT EYELID: Primary | ICD-10-CM

## 2022-03-31 LAB
HCG UR QL: NEGATIVE
INTERNAL QC OK POCT: NORMAL
POCT KIT EXPIRATION DATE: NORMAL
POCT KIT LOT NUMBER: NORMAL

## 2022-03-31 PROCEDURE — 81025 URINE PREGNANCY TEST: CPT | Performed by: PATHOLOGY

## 2022-03-31 PROCEDURE — 67904 REPAIR EYELID DEFECT: CPT | Mod: RT | Performed by: OPHTHALMOLOGY

## 2022-03-31 PROCEDURE — 67904 REPAIR EYELID DEFECT: CPT | Mod: RT

## 2022-03-31 RX ORDER — HYDROMORPHONE HYDROCHLORIDE 1 MG/ML
0.2 INJECTION, SOLUTION INTRAMUSCULAR; INTRAVENOUS; SUBCUTANEOUS EVERY 5 MIN PRN
Status: DISCONTINUED | OUTPATIENT
Start: 2022-03-31 | End: 2022-03-31 | Stop reason: HOSPADM

## 2022-03-31 RX ORDER — FENTANYL CITRATE 50 UG/ML
25 INJECTION, SOLUTION INTRAMUSCULAR; INTRAVENOUS
Status: DISCONTINUED | OUTPATIENT
Start: 2022-03-31 | End: 2022-04-01 | Stop reason: HOSPADM

## 2022-03-31 RX ORDER — FENTANYL CITRATE 50 UG/ML
INJECTION, SOLUTION INTRAMUSCULAR; INTRAVENOUS PRN
Status: DISCONTINUED | OUTPATIENT
Start: 2022-03-31 | End: 2022-03-31

## 2022-03-31 RX ORDER — ACETAMINOPHEN 325 MG/1
975 TABLET ORAL ONCE
Status: COMPLETED | OUTPATIENT
Start: 2022-03-31 | End: 2022-03-31

## 2022-03-31 RX ORDER — SODIUM CHLORIDE, SODIUM LACTATE, POTASSIUM CHLORIDE, CALCIUM CHLORIDE 600; 310; 30; 20 MG/100ML; MG/100ML; MG/100ML; MG/100ML
INJECTION, SOLUTION INTRAVENOUS CONTINUOUS
Status: DISCONTINUED | OUTPATIENT
Start: 2022-03-31 | End: 2022-04-01 | Stop reason: HOSPADM

## 2022-03-31 RX ORDER — ERYTHROMYCIN 5 MG/G
OINTMENT OPHTHALMIC PRN
Status: DISCONTINUED | OUTPATIENT
Start: 2022-03-31 | End: 2022-03-31 | Stop reason: HOSPADM

## 2022-03-31 RX ORDER — ERYTHROMYCIN 5 MG/G
OINTMENT OPHTHALMIC
Qty: 3.5 G | Refills: 0 | Status: SHIPPED | OUTPATIENT
Start: 2022-03-31 | End: 2022-05-31

## 2022-03-31 RX ORDER — PROPOFOL 10 MG/ML
INJECTION, EMULSION INTRAVENOUS PRN
Status: DISCONTINUED | OUTPATIENT
Start: 2022-03-31 | End: 2022-03-31

## 2022-03-31 RX ORDER — ONDANSETRON 2 MG/ML
4 INJECTION INTRAMUSCULAR; INTRAVENOUS EVERY 30 MIN PRN
Status: DISCONTINUED | OUTPATIENT
Start: 2022-03-31 | End: 2022-04-01 | Stop reason: HOSPADM

## 2022-03-31 RX ORDER — SODIUM CHLORIDE, SODIUM LACTATE, POTASSIUM CHLORIDE, CALCIUM CHLORIDE 600; 310; 30; 20 MG/100ML; MG/100ML; MG/100ML; MG/100ML
INJECTION, SOLUTION INTRAVENOUS CONTINUOUS
Status: DISCONTINUED | OUTPATIENT
Start: 2022-03-31 | End: 2022-03-31 | Stop reason: HOSPADM

## 2022-03-31 RX ORDER — TETRACAINE HYDROCHLORIDE 5 MG/ML
SOLUTION OPHTHALMIC PRN
Status: DISCONTINUED | OUTPATIENT
Start: 2022-03-31 | End: 2022-03-31 | Stop reason: HOSPADM

## 2022-03-31 RX ORDER — ONDANSETRON 4 MG/1
4 TABLET, ORALLY DISINTEGRATING ORAL EVERY 30 MIN PRN
Status: DISCONTINUED | OUTPATIENT
Start: 2022-03-31 | End: 2022-04-01 | Stop reason: HOSPADM

## 2022-03-31 RX ORDER — LIDOCAINE HYDROCHLORIDE 20 MG/ML
INJECTION, SOLUTION INFILTRATION; PERINEURAL PRN
Status: DISCONTINUED | OUTPATIENT
Start: 2022-03-31 | End: 2022-03-31

## 2022-03-31 RX ORDER — MEPERIDINE HYDROCHLORIDE 25 MG/ML
12.5 INJECTION INTRAMUSCULAR; INTRAVENOUS; SUBCUTANEOUS
Status: DISCONTINUED | OUTPATIENT
Start: 2022-03-31 | End: 2022-04-01 | Stop reason: HOSPADM

## 2022-03-31 RX ORDER — SODIUM CHLORIDE, SODIUM LACTATE, POTASSIUM CHLORIDE, CALCIUM CHLORIDE 600; 310; 30; 20 MG/100ML; MG/100ML; MG/100ML; MG/100ML
INJECTION, SOLUTION INTRAVENOUS CONTINUOUS PRN
Status: DISCONTINUED | OUTPATIENT
Start: 2022-03-31 | End: 2022-03-31

## 2022-03-31 RX ORDER — FENTANYL CITRATE 50 UG/ML
25 INJECTION, SOLUTION INTRAMUSCULAR; INTRAVENOUS EVERY 5 MIN PRN
Status: DISCONTINUED | OUTPATIENT
Start: 2022-03-31 | End: 2022-03-31 | Stop reason: HOSPADM

## 2022-03-31 RX ORDER — OXYCODONE HYDROCHLORIDE 5 MG/1
5 TABLET ORAL EVERY 4 HOURS PRN
Status: DISCONTINUED | OUTPATIENT
Start: 2022-03-31 | End: 2022-04-01 | Stop reason: HOSPADM

## 2022-03-31 RX ADMIN — ACETAMINOPHEN 975 MG: 325 TABLET ORAL at 11:11

## 2022-03-31 RX ADMIN — SODIUM CHLORIDE, SODIUM LACTATE, POTASSIUM CHLORIDE, CALCIUM CHLORIDE: 600; 310; 30; 20 INJECTION, SOLUTION INTRAVENOUS at 12:31

## 2022-03-31 RX ADMIN — LIDOCAINE HYDROCHLORIDE 60 MG: 20 INJECTION, SOLUTION INFILTRATION; PERINEURAL at 12:36

## 2022-03-31 RX ADMIN — FENTANYL CITRATE 50 MCG: 50 INJECTION, SOLUTION INTRAMUSCULAR; INTRAVENOUS at 12:36

## 2022-03-31 RX ADMIN — PROPOFOL 50 MG: 10 INJECTION, EMULSION INTRAVENOUS at 12:36

## 2022-03-31 NOTE — BRIEF OP NOTE
[facility]    Brief Operative Note    Pre-operative diagnosis: Thyroid eye disease [E05.00]  Eyelid retraction or lag [H02.539]  Post-operative diagnosis Same as pre-operative diagnosis    Procedure: Procedure(s):  REPAIR, PTOSIS RIGHT  Surgeon: Surgeon(s) and Role:     * Mia Hoskins MD - Primary   Tia Yang MD - assistant  Anesthesia: Monitor Anesthesia Care   Estimated Blood Loss: Minimal    Drains: None  Specimens: * No specimens in log *  Findings:   as expected.  Complications: None.  Implants: * No implants in log *

## 2022-03-31 NOTE — ANESTHESIA POSTPROCEDURE EVALUATION
Patient: Maia Woods    Procedure: Procedure(s):  REPAIR, PTOSIS RIGHT       Anesthesia Type:  MAC    Note:  Disposition: Outpatient   Postop Pain Control: Uneventful            Sign Out: Well controlled pain   PONV:    Neuro/Psych: Uneventful            Sign Out: Acceptable/Baseline neuro status   Airway/Respiratory: Uneventful            Sign Out: Acceptable/Baseline resp. status   CV/Hemodynamics: Uneventful            Sign Out: Acceptable CV status; No obvious hypovolemia; No obvious fluid overload   Other NRE:    DID A NON-ROUTINE EVENT OCCUR?            Last vitals:  Vitals Value Taken Time   /78 03/31/22 1319   Temp 36.6  C (97.8  F) 03/31/22 1258   Pulse 63 03/31/22 1319   Resp 16 03/31/22 1319   SpO2 100 % 03/31/22 1319       Electronically Signed By: Noble Wong MD  March 31, 2022  3:37 PM

## 2022-03-31 NOTE — DISCHARGE INSTRUCTIONS
Post-operative Instructions  Ophthalmic Plastic and Reconstructive Surgery    Mia Hoskins M.D.     All instructions apply to the operated eye(s) or eyelid(s).    Wound care and personal care  ? If a patch or bandage has been placed, please leave this in place until seen by your physician. Ensure that the bandage does not get wet when you take a shower.   ? Apply ice compresses 15 minutes of every hour while awake for 2 days. As long as there is no further bleeding, after two days, switch to warm water compresses for five minutes, four times a day until seen by your physician.   ? You may shower or wash your hair the day after surgery. Do not go swimming for at least 2 weeks to prevent contamination of your wounds.  ? You may go for walks and light activity is ok, but no heavy (over 15 pounds) lifting, bending or excessive straining for one week.   ? Do not apply make-up to the eyes or eyelids for 2 weeks after surgery.  ? Expect some swelling, bruising, black eye (even into the lower eyelids and cheeks). Also expect serum caking, crusting and discharge from the eye and/or incisions. A small amount of surface bleeding, and depending on the type of surgery, bleeding from the inside of the eyelid, is normal for the first 48 hours.  ? Avoid straining, bending at the waist, or lifting more than 15 pounds for 1 week. Sleeping with your head elevated, such as in a recliner, for the first several days can help swelling resolve more quickly.   ? Do continue to ambulate (walk) as you normally would - being sedentary after surgery can cause blood clots.   ? Your eye(s) and eyelid(s) may be painful and tender. This is normal after surgery.      Contact information and follow-up  ? Return to the Eye Clinic for a follow-up appointment with your physician as scheduled. If no appointment has been scheduled:   - [facility] clinic: [phone] for an appointment with Dr. Hoskins within 2 to 3 weeks from  your date of surgery.      -  [facility] clinic: [phone] for an appointment with Dr. Hoskins within 2 to 3 weeks from your date of surgery.     ? For severe pain, bleeding, or loss of vision, call the [facility] at [phone] or [facility] at [phone].     After hours or on weekends and holidays, call [phone] and ask to speak with the ophthalmologist on call.    An on call person can be reached after hours for concerns. The on call doctor should not call in medication refill requests after hours or on weekends, so please plan accordingly. An effort has been made to provide adequate pain medications following every surgery, and refills will not be provided in most instances.     Medications  ? Restart all regular home medications and eye drops. If you take Plavix or Aspirin on a regular basis, wait for 72 hours after your surgery before restarting these in order to decrease the risk of bleeding complications.  ? Avoid aspirin and aspirin-like medications (Motrin, Aleve, Ibuprofen, [place] etc) for 72 hours to reduce the risk of bleeding. You may take Tylenol (acetaminophen) for pain.  ? In addition to your home medications, take the following post-operative medications as prescribed by your physician.    ? Apply antibiotic ointment to all sutures three times a day, and into the operated eye(s) at night.      [facility]  Home Care Following Anesthesia  For 24 hours after surgery:  1. Get plenty of rest.  A responsible adult must stay with you for at least 24 hours after you leave the surgery center.  2. Do not drive or use heavy equipment.  If you have weakness or tingling, don't drive or use heavy equipment until this feeling goes away.   3. Do not drink alcohol.   4. Avoid strenuous or risky activities.  Ask for help when climbing stairs.  5. You may feel lightheaded.  IF so, sit for a few minutes  "before standing.  Have someone help you get up.   6. If you have nausea (feel sick to your stomach): Drink only clear liquids such as apple juice, ginger ale, broth or 7-Up.  Rest may also help.  Be sure to drink enough fluids.  Move to a regular diet as you feel able.   7. You may have a slight fever.  Call the doctor if your fever is over 100 F (37.7 C) (taken under the tongue) or lasts longer than 24 hours.  8. You may have a dry mouth, a sore throat, muscle aches or trouble sleeping. These should go away after 24 hours.  9. Do not make important or legal decisions.   10. It is recommended to avoid smoking.        Today you received a Marcaine or bupivacaine block to numb the nerves near your surgery site.  This is a block using local anesthetic or \"numbing\" medication injected around the nerves to anesthetize or \"numb\" the area supplied by those nerves.  This block is injected into the muscle layer near your surgical site.  The medication may numb the location where you had surgery for 6-18 hours, but may last up to 24 hours.  If your surgical site is an arm or leg you should be careful with your affected limb, since it is possible to injure your limb without being aware of it due to the numbing.  Until full feeling returns, you should guard against bumping or hitting your limb, and avoid extreme hot or cold temperatures on the skin.  As the block wears off, the feeling will return as a tingling or prickly sensation near your surgical site.  You will experience more discomfort from your incision as the feeling returns.  You may want to take a pain pill (a narcotic or Tylenol if this was prescribed by your surgeon) when you start to experience mild pain before the pain beccomes more severe.  If your pain medications do not control your pain you should notifiy your surgeon.    Tips for taking pain medications  To get the best pain relief possible, remember these points:    Take pain medications as directed, before " pain becomes severe.    Pain medication can upset your stomach: taking it with food may help.    Constipation is a common side effect of pain medication. Drink plenty of  fluids.    Eat foods high in fiber. Take a stool softener if recommended by your doctor or pharmacist.    Do not drink alcohol, drive or operate machinery while taking pain medications.    Ask about other ways to control pain, such as with heat, ice or relaxation.    Tylenol/Acetaminophen Consumption  To help encourage the safe use of acetaminophen, the makers of TYLENOL  have lowered the maximum daily dose for single-ingredient Extra Strength TYLENOL  (acetaminophen) products sold in the [place] from 8 pills per day (4,000 mg) to 6 pills per day (3,000 mg). The dosing interval has also changed from 2 pills every 4-6 hours to 2 pills every 6 hours.    If you feel your pain relief is insufficient, you may take Tylenol/Acetaminophen in addition to your narcotic pain medication.     Be careful not to exceed 3,000 mg of Tylenol/Acetaminophen in a 24 hour period from all sources.    If you are taking extra strength Tylenol/acetaminophen (500 mg), the maximum dose is 6 tablets in 24 hours.    If you are taking regular strength acetaminophen (325 mg), the maximum dose is 9 tablets in 24 hours.    Call a doctor for any of the followin. Signs of infection (fever, growing tenderness at the surgery site, a large amount of drainage or bleeding, severe pain, foul-smelling drainage, redness, swelling).  2. It has been over 8 to 10 hours since surgery and you are still not able to urinate (pass water).  3. Headache for over 24 hours.  4. Numbness, tingling or weakness the day after surgery (if you had spinal anesthesia).  5. Signs of Covid-19 infection (temperature over 100 degrees, shortness of breath, cough, loss of taste/smell, generalized body aches, persistent headache, chills, sore throat, nausea/vomiting/diarrhea)  Your doctor is:       Dr. Bellamy  Gato, Ophthalmology: [phone]               Or dial [phone] and ask for the resident on call for:  Ophthalmology  For emergency care, call the:  [facility]:  [phone] (TTY for hearing impaired: [phone])

## 2022-03-31 NOTE — ANESTHESIA CARE TRANSFER NOTE
Patient: Maia Woods    Procedure: Procedure(s):  REPAIR, PTOSIS RIGHT       Diagnosis: Thyroid eye disease [E05.00]  Eyelid retraction or lag [H02.539]  Diagnosis Additional Information: No value filed.    Anesthesia Type:   No value filed.     Note:      Level of Consciousness: awake  Oxygen Supplementation: room air    Independent Airway: airway patency satisfactory and stable        Patient transferred to: Phase II    Handoff Report: Identifed the Patient, Identified the Reponsible Provider, Reviewed the pertinent medical history, Discussed the surgical course, Reviewed Intra-OP anesthesia mangement and issues during anesthesia, Set expectations for post-procedure period and Allowed opportunity for questions and acknowledgement of understanding      Vitals:  Vitals Value Taken Time   BP     Temp     Pulse     Resp     SpO2         Electronically Signed By: JORGE Cheema CRNA  March 31, 2022  12:58 PM

## 2022-03-31 NOTE — OP NOTE
PREOPERATIVE DIAGNOSIS: Ptosis, right upper lid. Thyroid eye disease.   POSTOPERATIVE DIAGNOSIS:  Ptosis,right upper lid. Thyroid eye disease.  PROCEDURE:Right  upper eyelid  ptosis repair by external levator resection.   SURGEON: Mia Hoskins MD   ASSISTANT: Tia Yang MD  ANESTHESIA: Monitored with local infiltration of a 50/50 mixture of 2% lidocaine with epinephrine and 0.5% Marcaine.   COMPLICATIONS: None.   ESTIMATED BLOOD LOSS: Less than 5 mL.   HISTORY: Maia Woods  presented with ptosis of right upper lid interfering with the superior visual field and activities of daily living. This is in the setting of thyroid eye disease. After the risks, benefits and alternatives to the proposed procedure were explained, informed consent was obtained.   DESCRIPTION OF PROCEDURE: Maia Woods  was brought to the operating room and placed supine on the operating table. Intravenous sedation was given. The right upper lid crease was marked with a marking pen and infiltrated with local anesthetic. The area was prepped and draped in the typical sterile ophthalmic fashion. Attention was directed to the right  side. A lid crease incision was made with a 15 blade and dissection carried down to the orbicularis with high temperature cautery. The orbital septum was opened horizontally. The levator aponeurosis was identified and dissected from the superior tarsal border and the underlying Lee's muscle and advanced with a 6-0 vicryl suture to bring the lid into a normal height and contour. She was asked to open the eye and the suture adjusted for height and contour.  The skin was closed with with running 6-0 plain gut sutures.  Ophthalmic ointment was applied to the incision. The patient tolerated the procedure well and left the operating room in stable condition.     Mia Hoskins MD

## 2022-03-31 NOTE — ANESTHESIA PREPROCEDURE EVALUATION
Anesthesia Pre-Procedure Evaluation    Patient: Maia Woods   MRN: [id number] : 1995        Procedure : Procedure(s):  REPAIR, PTOSIS RIGHT          Past Medical History:   Diagnosis Date     Hyperthyroidism     Grave's disease     IBS (irritable bowel syndrome)      Migraine       Past Surgical History:   Procedure Laterality Date     AS RAD RESEC TONSIL/PILLARS       GYN SURGERY      hymenectomy     OPTICAL TRACKING SYSTEM ORBITOTOMY Bilateral 10/14/2021    Procedure: Bilateral lateral orbital decompression with navigation;  Surgeon: Mia Hoskins MD;  Location: [facility] OR     ORBITOTOMY Bilateral      REPAIR RETRACTION LID Right 10/1/2020    Procedure: Right upper eyelid retraction repair;  Surgeon: Mia Hoskins MD;  Location: [facility] OR     SONOPET EYE Bilateral 10/14/2021    Procedure: SONOPET EYE;  Surgeon: Mia Hoskins MD;  Location: [facility] OR     THYROIDECTOMY N/A 2020    Procedure: TOTAL THYROIDECTOMY;  Surgeon: Mimi Chau MD;  Location: [facility] OR     wisdom teeth extraction        Allergies   Allergen Reactions     Sulfa Drugs Hives      Social History     Tobacco Use     Smoking status: Never Smoker     Smokeless tobacco: Never Used   Substance Use Topics     Alcohol use: Yes     Comment: occas      Wt Readings from Last 1 Encounters:   22 65.8 kg (145 lb)           Physical Exam    Airway        Mallampati: II   TM distance: > 3 FB   Neck ROM: full   Mouth opening: > 3 cm    Respiratory Devices and Support         Dental  no notable dental history         Cardiovascular   cardiovascular exam normal          Pulmonary   pulmonary exam normal                OUTSIDE LABS:  CBC: No results found for: WBC, HGB, HCT, PLT  BMP:   Lab Results   Component Value Date     2020    POTASSIUM 4.5 2020    CHLORIDE 102 2020    CO2 26 2020    BUN 8 2020    CR 0.66 2020     (H) 2020     COAGS: No results found for: PTT, INR,  FIBR  POC:   Lab Results   Component Value Date    HCG Negative 03/31/2022     HEPATIC: No results found for: ALBUMIN, PROTTOTAL, ALT, AST, GGT, ALKPHOS, BILITOTAL, BILIDIRECT, HA  OTHER:   Lab Results   Component Value Date    IRLANDA 8.5 06/23/2020       Anesthesia Plan    ASA Status:  2   NPO Status:  NPO Appropriate    Anesthesia Type: MAC.     - Reason for MAC: straight local not clinically adequate   Induction: Intravenous, Propofol.   Maintenance: Balanced.        Consents    Anesthesia Plan(s) and associated risks, benefits, and realistic alternatives discussed. Questions answered and patient/representative(s) expressed understanding.    - Discussed:     - Discussed with:  Patient      - Extended Intubation/Ventilatory Support Discussed: No.      - Patient is DNR/DNI Status: No    Use of blood products discussed: No .     Postoperative Care    Pain management: IV analgesics, Oral pain medications, Multi-modal analgesia.   PONV prophylaxis: Dexamethasone or Solumedrol, Ondansetron (or other 5HT-3), Background Propofol Infusion     Comments:                Noble Wong MD

## 2022-04-12 ENCOUNTER — OFFICE VISIT (OUTPATIENT)
Dept: OPHTHALMOLOGY | Facility: CLINIC | Age: 27
End: 2022-04-12
Payer: COMMERCIAL

## 2022-04-12 DIAGNOSIS — H57.89 THYROID EYE DISEASE: ICD-10-CM

## 2022-04-12 DIAGNOSIS — H02.401 PTOSIS, RIGHT EYELID: ICD-10-CM

## 2022-04-12 DIAGNOSIS — Z98.890 POST-OPERATIVE STATE: Primary | ICD-10-CM

## 2022-04-12 DIAGNOSIS — E07.9 THYROID EYE DISEASE: ICD-10-CM

## 2022-04-12 PROCEDURE — 99024 POSTOP FOLLOW-UP VISIT: CPT | Mod: GC | Performed by: OPHTHALMOLOGY

## 2022-04-12 ASSESSMENT — VISUAL ACUITY
OD_SC: 20/20
OS_SC: 20/20
METHOD: SNELLEN - LINEAR

## 2022-04-12 ASSESSMENT — MARGIN REFLEX DISTANCE
OD_MRD1: 1
OS_MRD1: 4

## 2022-04-12 ASSESSMENT — TONOMETRY
IOP_METHOD: ICARE
OS_IOP_MMHG: 19
OD_IOP_MMHG: 16

## 2022-04-12 ASSESSMENT — EXTERNAL EXAM - RIGHT EYE: OD_EXAM: NORMAL

## 2022-04-12 NOTE — PROGRESS NOTES
"     Chief Complaint(s) and History of Present Illness(es)     Follow Up     Laterality: both eyes    Onset: unknown    Onset: years ago    Course: gradually improving    Associated symptoms: double vision (still experiencing doubling up and to   the right in the RE only).  Negative for dryness, eye pain, tearing,   photophobia, flashes, floaters and foreign body sensation    Treatments tried: ointment    Pain scale: 0/10    Comments: Procedure(s):  REPAIR, PTOSIS RIGHT  DOS 3/31/22    Pt here for post procedure care visit.  Pt states RE vision still doubling and doesn't look any different.  Pt wondering about swelling and when it will subside.  States \"all in all, healing is going well\".    Ocular meds = none    Derrick JORGENSEN, ELIZABEHT 8:26 AM 04/12/2022           Assessment & Plan     Maia Woods is a 26 year old female with the following diagnoses:   1. Post-operative state    2. Thyroid eye disease    3. Ptosis, right eyelid       -s/p right upper lid ptosis repair by external levator resection on 3/31/22 by Dr. Hoskins, doing well with no post operative pain, redness, or tearing  - she has not noticed a significant benefit yet  - the incision is healing well  - her MRD1 is close to 1mm today with minimal residual swelling  - follow up in 2 months for repeat evaluation to determine final outcome    Patient disposition:   Return in about 2 months (around 6/12/2022).          Julisa Marquez MD  Ophthalmology Resident, PGY-2  [facility]    Agree with above. Undercorrected, but moderate edema. Warm compresses, can alternate with ice if desires. May improve, if not discussed may require revision.     Attending Physician Attestation: Complete documentation of historical and exam elements from today's encounter can be found in the full encounter summary report (not reduplicated in this progress note). I personally obtained the chief complaint(s) and history of present illness. I confirmed and " edited as necessary the review of systems, past medical/surgical history, family history, social history, and examination findings as documented by others; and I examined the patient myself. I personally reviewed the relevant tests, images, and reports as documented above. I formulated and edited as necessary the assessment and plan and discussed the findings and management plan with the patient.  -Mia Hoskins MD

## 2022-04-12 NOTE — NURSING NOTE
"Chief Complaints and History of Present Illnesses   Patient presents with     Follow Up     Procedure(s):  REPAIR, PTOSIS RIGHT  DOS 3/31/22    Pt here for post procedure care visit.  Pt states RE vision still doubling and doesn't look any different.  Pt wondering about swelling and when it will subside.  States \"all in all, healing is going well\".    Ocular meds = none    Derrick JORGENSEN, ELIZABETH 8:26 AM 04/12/2022          Chief Complaint(s) and History of Present Illness(es)     Follow Up     Laterality: both eyes    Onset: unknown    Onset: years ago    Course: gradually improving    Associated symptoms: double vision (still experiencing doubling up and to the right in the RE only).  Negative for dryness, eye pain, tearing, photophobia, flashes, floaters and foreign body sensation    Treatments tried: ointment    Pain scale: 0/10    Comments: Procedure(s):  REPAIR, PTOSIS RIGHT  DOS 3/31/22    Pt here for post procedure care visit.  Pt states RE vision still doubling and doesn't look any different.  Pt wondering about swelling and when it will subside.  States \"all in all, healing is going well\".    Ocular meds = none    Derrcik JORGENSEN, ELIZABETH 8:26 AM 04/12/2022                     "

## 2022-05-09 ENCOUNTER — TELEPHONE (OUTPATIENT)
Dept: OPHTHALMOLOGY | Facility: CLINIC | Age: 27
End: 2022-05-09
Payer: COMMERCIAL

## 2022-05-09 NOTE — TELEPHONE ENCOUNTER
LVM regarding scheduling for double/blurry vision per patient with Dr Hoskins. Provided direct number for scheduling.

## 2022-05-09 NOTE — TELEPHONE ENCOUNTER
Spoke with patient regarding scheduling for a sooner appointment. Patient was able to schedule as offered and aware of date, time and location.-Per Patient

## 2022-05-31 ENCOUNTER — OFFICE VISIT (OUTPATIENT)
Dept: OPHTHALMOLOGY | Facility: CLINIC | Age: 27
End: 2022-05-31
Payer: COMMERCIAL

## 2022-05-31 ENCOUNTER — LAB (OUTPATIENT)
Dept: LAB | Facility: CLINIC | Age: 27
End: 2022-05-31

## 2022-05-31 DIAGNOSIS — H57.89 THYROID EYE DISEASE: Primary | ICD-10-CM

## 2022-05-31 DIAGNOSIS — H02.401 ACQUIRED PTOSIS OF RIGHT EYELID: ICD-10-CM

## 2022-05-31 DIAGNOSIS — E07.9 THYROID EYE DISEASE: ICD-10-CM

## 2022-05-31 DIAGNOSIS — H57.89 THYROID EYE DISEASE: ICD-10-CM

## 2022-05-31 DIAGNOSIS — E07.9 THYROID EYE DISEASE: Primary | ICD-10-CM

## 2022-05-31 PROCEDURE — 99000 SPECIMEN HANDLING OFFICE-LAB: CPT | Performed by: PATHOLOGY

## 2022-05-31 PROCEDURE — 36415 COLL VENOUS BLD VENIPUNCTURE: CPT | Performed by: PATHOLOGY

## 2022-05-31 PROCEDURE — 99024 POSTOP FOLLOW-UP VISIT: CPT | Performed by: OPHTHALMOLOGY

## 2022-05-31 PROCEDURE — 84445 ASSAY OF TSI GLOBULIN: CPT | Mod: 90 | Performed by: PATHOLOGY

## 2022-05-31 ASSESSMENT — TONOMETRY
IOP_METHOD: TONOPEN
OS_IOP_MMHG: 13
OD_IOP_MMHG: 16

## 2022-05-31 ASSESSMENT — CONF VISUAL FIELD
OS_NORMAL: 1
METHOD: COUNTING FINGERS
OD_NORMAL: 1

## 2022-05-31 ASSESSMENT — VISUAL ACUITY
METHOD: SNELLEN - LINEAR
OS_SC: 20/20
OD_SC: 20/20

## 2022-05-31 NOTE — NURSING NOTE
Chief Complaints and History of Present Illnesses   Patient presents with     Follow Up For Surgery Of Eye     Chief Complaint(s) and History of Present Illness(es)     Follow Up For Surgery Of Eye     Laterality: right eye    Associated symptoms: Negative for flashes, floaters and itching              Comments     Patient states ptosis has not improved. Patient denies pain and discomfort each eye. Patient states vision was never hindered from the lid, mostly from Thyroid Eye Disease. Patient states diplopia and pressure each eye. Patient denies flashes of light and new floaters each eye.     Olena Lemos, JAMEEL May 31, 2022 3:03 PM

## 2022-05-31 NOTE — PROGRESS NOTES
Oculoplastic Clinic New Patient    Patient: Maia Woods MRN# [id number]   YOB: 1995 Age: 26 year old   Date of Visit: May 31, 2022    CC: Droopy eyelids obstructing vision.              HPI:     Chief Complaint(s) and History of Present Illness(es)     Follow Up For Surgery Of Eye     Laterality: right eye    Associated symptoms: Negative for flashes, floaters and itching              Comments     Patient states ptosis has not improved. Patient denies pain and   discomfort each eye. Patient states vision was never hindered from the   lid, mostly from Thyroid Eye Disease. Patient states diplopia and pressure   each eye. Patient denies flashes of light and new floaters each eye.     JAMEEL Barnes May 31, 2022 3:03 PM       Ptosis continues to interfere with her peripheral vision with reading and driving  She has recurrent pressure behind the eyes and episodes of sudden on set blurry and double vision but resolves within 2-5 seconds. Artificial tears may help a little bit.    Normally no diplopia in primary but does have double in upgaze since decompression.    EXAM:     MRD1: 2 mm right eye and 5 mm left eye     VISUAL FIELD:  Right eye untaped:10 degrees Right eye taped:30 degrees    Assessment & Plan     Maia Woods is a 26 year old female with the following diagnoses:   1. Thyroid eye disease    2. Acquired ptosis of right eyelid       Initially s/p Right upper lid retraction repair 10/1/2020  Followed later with reactivation of Thyroid eye disease.  Underwent bilateral lateral decompression 10/14/2021 - IO palsy, mostly resolved diplopia except in upgaze  Right upper eyelid ptosis repair 3/31/2022    Given her new symptoms (pressure and episodes of diplopia - though may be monocular) will recheck TSIG. If normal, or trending down, can proceed with right upper eyelid ptosis repair - IZQUIERDO MUSCLE CONJUNCTIVAL RESECTION  8 mm.     ANTICOAGULATION:         PHOTOS  DEMONSTRATE:    Blepharoptosis    Attending Physician Attestation: Complete documentation of historical and exam elements from today's encounter can be found in the full encounter summary report (not reduplicated in this progress note). I personally obtained the chief complaint(s) and history of present illness. I confirmed and edited as necessary the review of systems, past medical/surgical history, family history, social history, and examination findings as documented by others; and I examined the patient myself. I personally reviewed the relevant tests, images, and reports as documented above. I formulated and edited as necessary the assessment and plan and discussed the findings and management plan with the patient. Mia Hoskins MD      Today with Maia Woods I reviewed the indications, risks, benefits, and alternatives of the proposed surgical procedure including, but not limited to, failure obtain the desired result  and need for additional surgery, bleeding, infection, loss of vision, loss of the eye, and the remote possibility of permanent damage to any organ system or death with the use of anesthesia.  I provided multiple opportunities for the questions, answered all questions to the best of my ability, and confirmed that my answers and my discussion were understood.

## 2022-06-01 ENCOUNTER — TELEPHONE (OUTPATIENT)
Dept: OPHTHALMOLOGY | Facility: CLINIC | Age: 27
End: 2022-06-01
Payer: COMMERCIAL

## 2022-06-01 PROBLEM — H02.401 ACQUIRED PTOSIS OF RIGHT EYELID: Status: ACTIVE | Noted: 2022-06-01

## 2022-06-01 NOTE — TELEPHONE ENCOUNTER
Spoke with patient to schedule surgery with Dr. Hoskins    Surgery was scheduled on 7/7 at [facility]  Patient will have H&P at [facility]     Patient is aware a COVID-19 test is needed before their procedure. The test should be with-in 4 days of their procedure.   Test Details: Date 7/5 Location [facility]    Post-Op visit was scheduled on 7/21  Patient is aware a / is needed day of surgery.   Surgery packet was mailed 6/1, patient has my direct contact information for any further questions.

## 2022-06-02 LAB — TSI SER-ACNC: 4.4 TSI INDEX

## 2022-06-23 ENCOUNTER — MYC MEDICAL ADVICE (OUTPATIENT)
Dept: OPHTHALMOLOGY | Facility: CLINIC | Age: 27
End: 2022-06-23

## 2022-06-29 ENCOUNTER — LAB (OUTPATIENT)
Dept: LAB | Facility: CLINIC | Age: 27
End: 2022-06-29
Payer: COMMERCIAL

## 2022-06-29 ENCOUNTER — OFFICE VISIT (OUTPATIENT)
Dept: OPHTHALMOLOGY | Facility: CLINIC | Age: 27
End: 2022-06-29
Attending: OPHTHALMOLOGY
Payer: COMMERCIAL

## 2022-06-29 DIAGNOSIS — H53.10 SUBJECTIVE VISUAL DISTURBANCE: ICD-10-CM

## 2022-06-29 DIAGNOSIS — H50.21 HYPERTROPIA OF RIGHT EYE: ICD-10-CM

## 2022-06-29 DIAGNOSIS — H53.2 DOUBLE VISION: ICD-10-CM

## 2022-06-29 DIAGNOSIS — H53.2 DOUBLE VISION: Primary | ICD-10-CM

## 2022-06-29 PROCEDURE — G0463 HOSPITAL OUTPT CLINIC VISIT: HCPCS

## 2022-06-29 PROCEDURE — 99214 OFFICE O/P EST MOD 30 MIN: CPT | Mod: 24 | Performed by: OPHTHALMOLOGY

## 2022-06-29 PROCEDURE — 92060 SENSORIMOTOR EXAMINATION: CPT | Performed by: OPHTHALMOLOGY

## 2022-06-29 PROCEDURE — 36415 COLL VENOUS BLD VENIPUNCTURE: CPT | Performed by: PATHOLOGY

## 2022-06-29 PROCEDURE — 99000 SPECIMEN HANDLING OFFICE-LAB: CPT | Performed by: PATHOLOGY

## 2022-06-29 PROCEDURE — 83519 RIA NONANTIBODY: CPT | Mod: 90 | Performed by: PATHOLOGY

## 2022-06-29 ASSESSMENT — EXTERNAL EXAM - RIGHT EYE: OD_EXAM: PTOSIS

## 2022-06-29 ASSESSMENT — VISUAL ACUITY
OS_SC: 20/20
METHOD: SNELLEN - LINEAR
OD_SC: 20/20

## 2022-06-29 ASSESSMENT — CUP TO DISC RATIO
OD_RATIO: 0.3
OS_RATIO: 0.4

## 2022-06-29 ASSESSMENT — CONF VISUAL FIELD
OD_NORMAL: 1
METHOD: COUNTING FINGERS
OS_NORMAL: 1

## 2022-06-29 ASSESSMENT — MARGIN REFLEX DISTANCE
OS_MRD1: 5
OD_MRD1: 1

## 2022-06-29 ASSESSMENT — SLIT LAMP EXAM - LIDS
COMMENTS: NORMAL
COMMENTS: PTOSIS

## 2022-06-29 ASSESSMENT — TONOMETRY
IOP_METHOD: TONOPEN
OS_IOP_MMHG: 19
OD_IOP_MMHG: 20

## 2022-06-29 ASSESSMENT — EXTERNAL EXAM - LEFT EYE: OS_EXAM: NORMAL

## 2022-06-29 NOTE — PROGRESS NOTES
1. 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   No evidence of disease activity today.    2. 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     Plan:  Check acetylcholine receptor binding antibody for unlikely possibility of an atypical presentation of myasthenia gravis.  She does not have any other clinical features today on exam that strongly implicate this diagnosis.    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  I have encouraged her for now to observe and see if these episodes will self resolve.  If they do not and they remain sufficiently bothersome then the next step would be a trial of gabapentin and/or Tegretol.    I do not believe that she has reactivated thyroid eye disease based upon her stable strabismus examination today as well as lack of other clinical features to suggest that diagnosis.  Also her TSI blood test is actually down 4 weeks ago from the prior value 10 months ago.  From my perspective she is cleared to proceed with her eyelid revision surgery with Dr. Hoskins.    Follow-up in 3 months.     Interim history and exam with me since last visit 3/8/22:    LINDSEY with bilateral proptosis and eyelid asymmetry status post prior right upper lid retraction repair and bilateral lateral orbital decompression.    Right lid ptosis repair on 3/31, redo planned on 7/7/22.     Baseline vision was normal and clear in all gazes except RUE gaze which provoked diplopia, with vertical images and with some overlap of the images. Acuity was normal leading up to March 31 surgery. Daily bouts of binocular diplopia and blurriness lasting seconds.  Noticing blurriness to  "vision with accommodation and near vision, especially looking at phone. This usually do not happen with far vision, but occasionally happens while driving. Lasts seconds if she blinks and strains, but would take longer to resolve without attempting to help. Blurring resolves with monocular closure.    No change to ptosis.     She has not noticed any other changes that would suggest reactivation of thyroid eye disease.  She has not noticed periocular edema or injection.  She also had her recent visit with Dr. Hoskins reported that he did not see clinical indications of reactivation of thyroid eye disease.     TSI on 5/31/22 was 4.4 which is down from 4.6    No dysphagia.  No muscle weakness in any other muscle groups.  Her right upper lid ptosis is not variable.    S/P Bilateral lateral orbital decompression with navigation 10/14/21     Last visit with Dr. Hoskins 12/21/21  \"Overall doing well post bilateral lateral orbital decompression. Kena improved 4.5 mm bilaterally. Incisions healing nicely.   Several weeks after surgery began to notice diplopia in upgaze and right gaze. She is unsure if it was present immediately after surgery as her vision was a bit blurry. She has a small left hypo in upgaze, which was also noticed on preop strab measurements, but also a new larger left hypo on far right gaze.   We discussed this could be post surgical, or it is possible that it could indicate active thyroid eye disease.   I recommend observation at this point. Warm compresses, and artificial tears. Sunscreen on incision if in the sun.  F/u with Dr. Zuñiga in about 2 months, then return to see me once ready to address eyelids.\"      Exam today showed normal afferent visual function in both eyes.  Sensorimotor exam showed orthophoria in primary gaze, down gaze.  In up gaze there is a 1 prism diopter left hypotropia and in up right gaze there is a left hypotropia of 4 prism diopters.  Motility is essentially " full in both eyes.  Sensorimotor exam is stable compared to baseline and does not correlate / explain her episodes of binocular diplopia / blurring.     There is stable right upper lid ptosis and no significant periocular edema nor injection to suggest active thyroid eye disease.      35 minutes were spent on the date of the encounter by me doing chart review, history and exam, documentation, and further activities as noted above    Complete documentation of historical and exam elements from today's encounter can be found in the full encounter summary report (not reduplicated in this progress note).  I personally obtained the chief complaint(s) and history of present illness.  I confirmed and edited as necessary the review of systems, past medical/surgical history, family history, social history, and examination findings as documented by others; and I examined the patient myself.  I personally reviewed the relevant tests, images, and reports as documented above.  I formulated and edited as necessary the assessment and plan and discussed the findings and management plan with the patient and family     John Paul Zuñiga MD

## 2022-06-29 NOTE — NURSING NOTE
Chief Complaint(s) and History of Present Illness(es)     Diplopia Follow-Up     In both eyes (Thyroid eye disease).  Characterized as oblique.  Occurring intermittently.  Since onset it is stable.  Associated symptoms include blurred vision and headaches.  Negative for eye pain (Headaches not associated with diplopia or blurry vision).              Comments     1. Thyroid eye disease  Pt is s/p bilateral lateral orbital decompression with navigation   10/14/21 and s/p RUL ptosis repair by external levator resection on 03/31/22.  Pt reports oblique double vision happens randomly throughout the day, happens more when she is tired, only lasts up to 10 seconds.  She winks and blinks for it go away.  Blurry vision associated when she gets double vision, otherwise no other vision concerns.   CRISTINA Morrell 6/29/2022 12:42 PM

## 2022-06-29 NOTE — LETTER
2022    RE: Maia Woods  : 1995  MRN: [id number]    Dear Providers,    I saw our mutual patient, Maia Woods, in follow-up in my clinic recently.  After a thorough neuro-ophthalmic history and examination, I came to the following conclusions:      1. 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   No evidence of disease activity today.    2. 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     Plan:  Check acetylcholine receptor binding antibody for unlikely possibility of an atypical presentation of myasthenia gravis.  She does not have any other clinical features today on exam that strongly implicate this diagnosis.    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  I have encouraged her for now to observe and see if these episodes will self resolve.  If they do not and they remain sufficiently bothersome then the next step would be a trial of gabapentin and/or Tegretol.    I do not believe that she has reactivated thyroid eye disease based upon her stable strabismus examination today as well as lack of other clinical features to suggest that diagnosis.  Also her TSI blood test is actually down 4 weeks ago from the prior value 10 months ago.  From my perspective she is cleared to proceed with her eyelid revision surgery with Dr. Hoskins.    Follow-up in 3 months.     Interim history and exam with me since last visit 3/8/22:    LINDSEY with bilateral proptosis and eyelid asymmetry status post prior right upper lid retraction repair and bilateral lateral orbital decompression.    Right lid ptosis repair on 3/31, redo planned on 22.   Baseline vision was  "normal and clear in all gazes except RUE gaze which provoked diplopia, with vertical images and with some overlap of the images. Acuity was normal leading up to March 31 surgery. Daily bouts of binocular diplopia and blurriness lasting seconds.  Noticing blurriness to vision with accommodation and near vision, especially looking at phone. This usually do not happen with far vision, but occasionally happens while driving. Lasts seconds if she blinks and strains, but would take longer to resolve without attempting to help. Blurring resolves with monocular closure.    No change to ptosis.     She has not noticed any other changes that would suggest reactivation of thyroid eye disease.  She has not noticed periocular edema or injection.  She also had her recent visit with Dr. Hoskins reported that he did not see clinical indications of reactivation of thyroid eye disease.     TSI on 5/31/22 was 4.4 which is down from 4.6    No dysphagia.  No muscle weakness in any other muscle groups.  Her right upper lid ptosis is not variable.    S/P Bilateral lateral orbital decompression with navigation 10/14/21.    Last visit with Dr. Hoskins 12/21/21  \"Overall doing well post bilateral lateral orbital decompression. Kena improved 4.5 mm bilaterally. Incisions healing nicely.   Several weeks after surgery began to notice diplopia in upgaze and right gaze. She is unsure if it was present immediately after surgery as her vision was a bit blurry. She has a small left hypo in upgaze, which was also noticed on preop strab measurements, but also a new larger left hypo on far right gaze.   We discussed this could be post surgical, or it is possible that it could indicate active thyroid eye disease.   I recommend observation at this point. Warm compresses, and artificial tears. Sunscreen on incision if in the sun.  F/u with Dr. Zuñiga in about 2 months, then return to see me once ready to address eyelids.\"      Exam today " showed normal afferent visual function in both eyes.  Sensorimotor exam showed orthophoria in primary gaze, down gaze.  In up gaze there is a 1 prism diopter left hypotropia and in up right gaze there is a left hypotropia of 4 prism diopters.  Motility is essentially full in both eyes.  Sensorimotor exam is stable compared to baseline and does not correlate / explain her episodes of binocular diplopia / blurring.     There is stable right upper lid ptosis and no significant periocular edema nor injection to suggest active thyroid eye disease.       For further exam details, please feel free to contact our office for additional records.  If you wish to contact me regarding this patient please email me at [email or web address] or give my clinic a call to arrange a phone conversation.    Sincerely,    John Paul Zuñiga MD  , Neuro-Ophthalmology and Adult Strabismus Surgery  [facility] in Neuro-Ophthalmology  Department of Ophthalmology and Visual Neurosciences  [facility]

## 2022-07-01 ENCOUNTER — OFFICE VISIT (OUTPATIENT)
Dept: FAMILY MEDICINE | Facility: CLINIC | Age: 27
End: 2022-07-01
Payer: COMMERCIAL

## 2022-07-01 VITALS
WEIGHT: 144 LBS | DIASTOLIC BLOOD PRESSURE: 80 MMHG | TEMPERATURE: 97.7 F | SYSTOLIC BLOOD PRESSURE: 114 MMHG | BODY MASS INDEX: 22.55 KG/M2 | OXYGEN SATURATION: 98 % | HEART RATE: 77 BPM

## 2022-07-01 DIAGNOSIS — Z11.59 NEED FOR HEPATITIS C SCREENING TEST: ICD-10-CM

## 2022-07-01 DIAGNOSIS — Z11.4 SCREENING FOR HIV (HUMAN IMMUNODEFICIENCY VIRUS): ICD-10-CM

## 2022-07-01 DIAGNOSIS — Z12.4 CERVICAL CANCER SCREENING: ICD-10-CM

## 2022-07-01 PROCEDURE — 99214 OFFICE O/P EST MOD 30 MIN: CPT | Performed by: INTERNAL MEDICINE

## 2022-07-01 NOTE — H&P (VIEW-ONLY)
[facility]  [address] [place] [address]  [place] [address]  Phone: [phone]  Primary Provider: Olga Junior  Pre-op Performing Provider: ADELE BENÍTEZ      PREOPERATIVE EVALUATION:  Today's date: 7/1/2022    Maia Woods is a 26 year old female who presents for a preoperative evaluation.    Surgical Information:  Surgery/Procedure: Proptosis due to thyroid disorder; acquired ptosis of right eyelid  Surgery Location: [facility]  Surgeon: Dr. Hoskins   Surgery Date: 7/7/22  Time of Surgery:   Where patient plans to recover: At home with family  Fax number for surgical facility: Note does not need to be faxed, will be available electronically in Epic.    Type of Anesthesia Anticipated: General    Assessment & Plan     The proposed surgical procedure is considered LOW risk.    Problem List Items Addressed This Visit    None     Visit Diagnoses     Screening for HIV (human immunodeficiency virus)        Need for hepatitis C screening test        Cervical cancer screening                   Risks and Recommendations:  The patient has the following additional risks and recommendations for perioperative complications:   - No identified additional risk factors other than previously addressed    Medication Instructions:  Patient is to take all scheduled medications on the day of surgery    RECOMMENDATION:  APPROVAL GIVEN to proceed with proposed procedure, without further diagnostic evaluation.                      Subjective     HPI related to upcoming procedure: right ptosis with grave's diagnosis  S/p surgery for grave's       Preop Questions 7/1/2022   1. Have you ever had a heart attack or stroke? No   2. Have you ever had surgery on your heart or blood vessels, such as a stent placement, a coronary artery bypass, or surgery on an artery in your head, neck, heart, or legs? No   3. Do you have chest pain with activity? No   4. Do you have a history of  heart  failure? No   5. Do you currently have a cold, bronchitis or symptoms of other infection? No   6. Do you have a cough, shortness of breath, or wheezing? No   7. Do you or anyone in your family have previous history of blood clots? No   8. Do you or does anyone in your family have a serious bleeding problem such as prolonged bleeding following surgeries or cuts? No   9. Have you ever had problems with anemia or been told to take iron pills? No   10. Have you had any abnormal blood loss such as black, tarry or bloody stools, or abnormal vaginal bleeding? No   11. Have you ever had a blood transfusion? No   12. Are you willing to have a blood transfusion if it is medically needed before, during, or after your surgery? Yes   13. Have you or any of your relatives ever had problems with anesthesia? No   14. Do you have sleep apnea, excessive snoring or daytime drowsiness? No   15. Do you have any artifical heart valves or other implanted medical devices like a pacemaker, defibrillator, or continuous glucose monitor? No   16. Do you have artificial joints? No   17. Are you allergic to latex? No   18. Is there any chance that you may be pregnant? No       Health Care Directive:  Patient does not have a Health Care Directive or Living Will: Discussed advance care planning with patient; information given to patient to review.    Preoperative Review of :   reviewed - no record of controlled substances prescribed.      Status of Chronic Conditions:      Review of Systems  CONSTITUTIONAL: NEGATIVE for fever, chills, change in weight  ENT/MOUTH: NEGATIVE for ear, mouth and throat problems  RESP: NEGATIVE for significant cough or SOB  CV: NEGATIVE for chest pain, palpitations or peripheral edema    Patient Active Problem List    Diagnosis Date Noted     Acquired ptosis of right eyelid 06/01/2022     Priority: Medium     Added automatically from request for surgery [id number]       Eyelid retraction or lag 09/03/2020      Priority: Medium     Added automatically from request for surgery [id number]       Proptosis due to thyroid disorder 09/03/2020     Priority: Medium     Added automatically from request for surgery [id number]       S/P thyroid surgery 06/22/2020     Priority: Medium     Graves disease 05/21/2020     Priority: Medium     Added automatically from request for surgery [id number]        Past Medical History:   Diagnosis Date     Hyperthyroidism     Grave's disease     IBS (irritable bowel syndrome)      Migraine      Past Surgical History:   Procedure Laterality Date     AS RAD RESEC TONSIL/PILLARS  2005     GYN SURGERY      hymenectomy     OPTICAL TRACKING SYSTEM ORBITOTOMY Bilateral 10/14/2021    Procedure: Bilateral lateral orbital decompression with navigation;  Surgeon: Mia Hoskins MD;  Location: [facility] OR     ORBITOTOMY Bilateral      REPAIR PTOSIS Right 3/31/2022    Procedure: REPAIR, PTOSIS RIGHT;  Surgeon: Mia Hoskins MD;  Location: [facility] OR     REPAIR RETRACTION LID Right 10/1/2020    Procedure: Right upper eyelid retraction repair;  Surgeon: Mia Hoskins MD;  Location: [facility] OR     SONOPET EYE Bilateral 10/14/2021    Procedure: SONOPET EYE;  Surgeon: Mia Hoskins MD;  Location: [facility] OR     THYROIDECTOMY N/A 6/22/2020    Procedure: TOTAL THYROIDECTOMY;  Surgeon: Mimi Chau MD;  Location: [facility] OR     wisdom teeth extraction       Current Outpatient Medications   Medication Sig Dispense Refill     acetaminophen (TYLENOL) 500 MG tablet Take 500-1,000 mg by mouth every 6 hours as needed for mild pain       Aspirin-Acetaminophen-Caffeine (EXCEDRIN PO) Take 1 tablet by mouth daily as needed        levothyroxine (SYNTHROID/LEVOTHROID) 125 MCG tablet        selenium 50 MCG TABS tablet Take 50 mcg by mouth daily         Allergies   Allergen Reactions     Sulfa Drugs Hives        Social History     Tobacco Use     Smoking status: Never Smoker     Smokeless tobacco: Never Used   Substance Use Topics      Alcohol use: Yes     Comment: occas       History   Drug Use Unknown         Objective     There were no vitals taken for this visit.    Physical Exam  GENERAL APPEARANCE: healthy, alert and no distress  HENT: ear canals and TM's normal and nose and mouth without ulcers or lesions  RESP: lungs clear to auscultation - no rales, rhonchi or wheezes  CV: regular rate and rhythm, normal S1 S2, no S3 or S4 and no murmur, click or rub   ABDOMEN: soft, nontender, no HSM or masses and bowel sounds normal  NEURO: Normal strength and tone, sensory exam grossly normal, mentation intact and speech normal    No results for input(s): HGB, PLT, INR, NA, POTASSIUM, CR, A1C in the last 91275 hours.     Diagnostics:  No labs were ordered during this visit.   No EKG required for low risk surgery (cataract, skin procedure, breast biopsy, etc).    Revised Cardiac Risk Index (RCRI):  The patient has the following serious cardiovascular risks for perioperative complications:   - No serious cardiac risks = 0 points     RCRI Interpretation: 0 points: Class I (very low risk - 0.4% complication rate)           Signed Electronically by: Jayesh Anton MD  Copy of this evaluation report is provided to requesting physician.

## 2022-07-01 NOTE — PROGRESS NOTES
[facility]  [address] [place] [address]  [place] [address]  Phone: [phone]  Primary Provider: Olga Junior  Pre-op Performing Provider: ADELE BENÍTEZ      PREOPERATIVE EVALUATION:  Today's date: 7/1/2022    Maia Woods is a 26 year old female who presents for a preoperative evaluation.    Surgical Information:  Surgery/Procedure: Proptosis due to thyroid disorder; acquired ptosis of right eyelid  Surgery Location: [facility]  Surgeon: Dr. Hoskins   Surgery Date: 7/7/22  Time of Surgery:   Where patient plans to recover: At home with family  Fax number for surgical facility: Note does not need to be faxed, will be available electronically in Epic.    Type of Anesthesia Anticipated: General    Assessment & Plan     The proposed surgical procedure is considered LOW risk.    Problem List Items Addressed This Visit    None     Visit Diagnoses     Screening for HIV (human immunodeficiency virus)        Need for hepatitis C screening test        Cervical cancer screening                   Risks and Recommendations:  The patient has the following additional risks and recommendations for perioperative complications:   - No identified additional risk factors other than previously addressed    Medication Instructions:  Patient is to take all scheduled medications on the day of surgery    RECOMMENDATION:  APPROVAL GIVEN to proceed with proposed procedure, without further diagnostic evaluation.                      Subjective     HPI related to upcoming procedure: right ptosis with grave's diagnosis  S/p surgery for grave's       Preop Questions 7/1/2022   1. Have you ever had a heart attack or stroke? No   2. Have you ever had surgery on your heart or blood vessels, such as a stent placement, a coronary artery bypass, or surgery on an artery in your head, neck, heart, or legs? No   3. Do you have chest pain with activity? No   4. Do you have a history of  heart  failure? No   5. Do you currently have a cold, bronchitis or symptoms of other infection? No   6. Do you have a cough, shortness of breath, or wheezing? No   7. Do you or anyone in your family have previous history of blood clots? No   8. Do you or does anyone in your family have a serious bleeding problem such as prolonged bleeding following surgeries or cuts? No   9. Have you ever had problems with anemia or been told to take iron pills? No   10. Have you had any abnormal blood loss such as black, tarry or bloody stools, or abnormal vaginal bleeding? No   11. Have you ever had a blood transfusion? No   12. Are you willing to have a blood transfusion if it is medically needed before, during, or after your surgery? Yes   13. Have you or any of your relatives ever had problems with anesthesia? No   14. Do you have sleep apnea, excessive snoring or daytime drowsiness? No   15. Do you have any artifical heart valves or other implanted medical devices like a pacemaker, defibrillator, or continuous glucose monitor? No   16. Do you have artificial joints? No   17. Are you allergic to latex? No   18. Is there any chance that you may be pregnant? No       Health Care Directive:  Patient does not have a Health Care Directive or Living Will: Discussed advance care planning with patient; information given to patient to review.    Preoperative Review of :   reviewed - no record of controlled substances prescribed.      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 09/03/2020     Priority: Medium     Added automatically from request for surgery [id number]       S/P thyroid surgery 06/22/2020     Priority: Medium     Graves disease 05/21/2020     Priority: Medium     Added automatically from request for surgery [id number]        Past Medical History:   Diagnosis Date     Hyperthyroidism     Grave's disease     IBS (irritable bowel syndrome)      Migraine      Past Surgical History:   Procedure Laterality Date     AS RAD RESEC TONSIL/PILLARS  2005     GYN SURGERY      hymenectomy     OPTICAL TRACKING SYSTEM ORBITOTOMY Bilateral 10/14/2021    Procedure: Bilateral lateral orbital decompression with navigation;  Surgeon: Mia Hoskins MD;  Location: [facility] OR     ORBITOTOMY Bilateral      REPAIR PTOSIS Right 3/31/2022    Procedure: REPAIR, PTOSIS RIGHT;  Surgeon: Mia Hoskins MD;  Location: [facility] OR     REPAIR RETRACTION LID Right 10/1/2020    Procedure: Right upper eyelid retraction repair;  Surgeon: Mia Hoskins MD;  Location: [facility] OR     SONOPET EYE Bilateral 10/14/2021    Procedure: SONOPET EYE;  Surgeon: Mia Hoskins MD;  Location: [facility] OR     THYROIDECTOMY N/A 6/22/2020    Procedure: TOTAL THYROIDECTOMY;  Surgeon: Mimi Chau MD;  Location: [facility] OR     wisdom teeth extraction       Current Outpatient Medications   Medication Sig Dispense Refill     acetaminophen (TYLENOL) 500 MG tablet Take 500-1,000 mg by mouth every 6 hours as needed for mild pain       Aspirin-Acetaminophen-Caffeine (EXCEDRIN PO) Take 1 tablet by mouth daily as needed        levothyroxine (SYNTHROID/LEVOTHROID) 125 MCG tablet        selenium 50 MCG TABS tablet Take 50 mcg by mouth daily         Allergies   Allergen Reactions     Sulfa Drugs Hives        Social History     Tobacco Use     Smoking status: Never Smoker     Smokeless tobacco: Never Used   Substance Use Topics      Alcohol use: Yes     Comment: occas       History   Drug Use Unknown         Objective     There were no vitals taken for this visit.    Physical Exam  GENERAL APPEARANCE: healthy, alert and no distress  HENT: ear canals and TM's normal and nose and mouth without ulcers or lesions  RESP: lungs clear to auscultation - no rales, rhonchi or wheezes  CV: regular rate and rhythm, normal S1 S2, no S3 or S4 and no murmur, click or rub   ABDOMEN: soft, nontender, no HSM or masses and bowel sounds normal  NEURO: Normal strength and tone, sensory exam grossly normal, mentation intact and speech normal    No results for input(s): HGB, PLT, INR, NA, POTASSIUM, CR, A1C in the last 04954 hours.     Diagnostics:  No labs were ordered during this visit.   No EKG required for low risk surgery (cataract, skin procedure, breast biopsy, etc).    Revised Cardiac Risk Index (RCRI):  The patient has the following serious cardiovascular risks for perioperative complications:   - No serious cardiac risks = 0 points     RCRI Interpretation: 0 points: Class I (very low risk - 0.4% complication rate)           Signed Electronically by: Jayesh Anton MD  Copy of this evaluation report is provided to requesting physician.

## 2022-07-02 LAB — ACHR BIND AB SER-SCNC: 0 NMOL/L

## 2022-07-06 ENCOUNTER — ANESTHESIA EVENT (OUTPATIENT)
Dept: SURGERY | Facility: AMBULATORY SURGERY CENTER | Age: 27
End: 2022-07-06
Payer: COMMERCIAL

## 2022-07-07 ENCOUNTER — ANESTHESIA (OUTPATIENT)
Dept: SURGERY | Facility: AMBULATORY SURGERY CENTER | Age: 27
End: 2022-07-07
Payer: COMMERCIAL

## 2022-07-07 ENCOUNTER — HOSPITAL ENCOUNTER (OUTPATIENT)
Facility: AMBULATORY SURGERY CENTER | Age: 27
Discharge: HOME | End: 2022-07-07
Attending: OPHTHALMOLOGY
Payer: COMMERCIAL

## 2022-07-07 VITALS
RESPIRATION RATE: 16 BRPM | HEART RATE: 64 BPM | DIASTOLIC BLOOD PRESSURE: 66 MMHG | SYSTOLIC BLOOD PRESSURE: 110 MMHG | TEMPERATURE: 97.4 F | OXYGEN SATURATION: 98 % | WEIGHT: 145 LBS | HEIGHT: 67 IN | BODY MASS INDEX: 22.76 KG/M2

## 2022-07-07 DIAGNOSIS — H02.401 ACQUIRED PTOSIS OF RIGHT EYELID: ICD-10-CM

## 2022-07-07 PROCEDURE — 67908 REPAIR EYELID DEFECT: CPT | Mod: RT

## 2022-07-07 PROCEDURE — 81025 URINE PREGNANCY TEST: CPT | Performed by: PATHOLOGY

## 2022-07-07 PROCEDURE — 67908 REPAIR EYELID DEFECT: CPT | Mod: RT | Performed by: OPHTHALMOLOGY

## 2022-07-07 RX ORDER — FENTANYL CITRATE 50 UG/ML
25 INJECTION, SOLUTION INTRAMUSCULAR; INTRAVENOUS EVERY 5 MIN PRN
Status: DISCONTINUED | OUTPATIENT
Start: 2022-07-07 | End: 2022-07-07 | Stop reason: HOSPADM

## 2022-07-07 RX ORDER — SODIUM CHLORIDE, SODIUM LACTATE, POTASSIUM CHLORIDE, CALCIUM CHLORIDE 600; 310; 30; 20 MG/100ML; MG/100ML; MG/100ML; MG/100ML
INJECTION, SOLUTION INTRAVENOUS CONTINUOUS
Status: DISCONTINUED | OUTPATIENT
Start: 2022-07-07 | End: 2022-07-08 | Stop reason: HOSPADM

## 2022-07-07 RX ORDER — OMEGA-3 FATTY ACIDS/FISH OIL 300-1000MG
200 CAPSULE ORAL EVERY 4 HOURS PRN
COMMUNITY

## 2022-07-07 RX ORDER — FENTANYL CITRATE 50 UG/ML
INJECTION, SOLUTION INTRAMUSCULAR; INTRAVENOUS PRN
Status: DISCONTINUED | OUTPATIENT
Start: 2022-07-07 | End: 2022-07-07

## 2022-07-07 RX ORDER — PROPOFOL 10 MG/ML
INJECTION, EMULSION INTRAVENOUS PRN
Status: DISCONTINUED | OUTPATIENT
Start: 2022-07-07 | End: 2022-07-07

## 2022-07-07 RX ORDER — ONDANSETRON 2 MG/ML
INJECTION INTRAMUSCULAR; INTRAVENOUS PRN
Status: DISCONTINUED | OUTPATIENT
Start: 2022-07-07 | End: 2022-07-07

## 2022-07-07 RX ORDER — NEOMYCIN SULFATE, POLYMYXIN B SULFATE AND DEXAMETHASONE 3.5; 10000; 1 MG/ML; [USP'U]/ML; MG/ML
1 SUSPENSION/ DROPS OPHTHALMIC 4 TIMES DAILY
Qty: 5 ML | Refills: 0 | Status: SHIPPED | OUTPATIENT
Start: 2022-07-07 | End: 2022-07-17

## 2022-07-07 RX ORDER — LIDOCAINE 40 MG/G
CREAM TOPICAL
Status: DISCONTINUED | OUTPATIENT
Start: 2022-07-07 | End: 2022-07-07 | Stop reason: HOSPADM

## 2022-07-07 RX ORDER — OXYCODONE HYDROCHLORIDE 5 MG/1
5 TABLET ORAL EVERY 4 HOURS PRN
Status: DISCONTINUED | OUTPATIENT
Start: 2022-07-07 | End: 2022-07-08 | Stop reason: HOSPADM

## 2022-07-07 RX ORDER — LIDOCAINE HYDROCHLORIDE 20 MG/ML
INJECTION, SOLUTION INFILTRATION; PERINEURAL PRN
Status: DISCONTINUED | OUTPATIENT
Start: 2022-07-07 | End: 2022-07-07

## 2022-07-07 RX ORDER — FENTANYL CITRATE 50 UG/ML
25 INJECTION, SOLUTION INTRAMUSCULAR; INTRAVENOUS
Status: DISCONTINUED | OUTPATIENT
Start: 2022-07-07 | End: 2022-07-08 | Stop reason: HOSPADM

## 2022-07-07 RX ORDER — SODIUM CHLORIDE, SODIUM LACTATE, POTASSIUM CHLORIDE, CALCIUM CHLORIDE 600; 310; 30; 20 MG/100ML; MG/100ML; MG/100ML; MG/100ML
INJECTION, SOLUTION INTRAVENOUS CONTINUOUS
Status: DISCONTINUED | OUTPATIENT
Start: 2022-07-07 | End: 2022-07-07 | Stop reason: HOSPADM

## 2022-07-07 RX ORDER — ONDANSETRON 4 MG/1
4 TABLET, ORALLY DISINTEGRATING ORAL EVERY 30 MIN PRN
Status: DISCONTINUED | OUTPATIENT
Start: 2022-07-07 | End: 2022-07-08 | Stop reason: HOSPADM

## 2022-07-07 RX ORDER — MEPERIDINE HYDROCHLORIDE 25 MG/ML
12.5 INJECTION INTRAMUSCULAR; INTRAVENOUS; SUBCUTANEOUS
Status: DISCONTINUED | OUTPATIENT
Start: 2022-07-07 | End: 2022-07-08 | Stop reason: HOSPADM

## 2022-07-07 RX ORDER — ERYTHROMYCIN 5 MG/G
OINTMENT OPHTHALMIC PRN
Status: DISCONTINUED | OUTPATIENT
Start: 2022-07-07 | End: 2022-07-07 | Stop reason: HOSPADM

## 2022-07-07 RX ORDER — TETRACAINE HYDROCHLORIDE 5 MG/ML
SOLUTION OPHTHALMIC PRN
Status: DISCONTINUED | OUTPATIENT
Start: 2022-07-07 | End: 2022-07-07 | Stop reason: HOSPADM

## 2022-07-07 RX ORDER — ONDANSETRON 2 MG/ML
4 INJECTION INTRAMUSCULAR; INTRAVENOUS EVERY 30 MIN PRN
Status: DISCONTINUED | OUTPATIENT
Start: 2022-07-07 | End: 2022-07-08 | Stop reason: HOSPADM

## 2022-07-07 RX ADMIN — SODIUM CHLORIDE, SODIUM LACTATE, POTASSIUM CHLORIDE, CALCIUM CHLORIDE: 600; 310; 30; 20 INJECTION, SOLUTION INTRAVENOUS at 09:45

## 2022-07-07 RX ADMIN — ONDANSETRON 4 MG: 2 INJECTION INTRAMUSCULAR; INTRAVENOUS at 09:58

## 2022-07-07 RX ADMIN — PROPOFOL 40 MG: 10 INJECTION, EMULSION INTRAVENOUS at 09:50

## 2022-07-07 RX ADMIN — LIDOCAINE HYDROCHLORIDE 60 MG: 20 INJECTION, SOLUTION INFILTRATION; PERINEURAL at 09:50

## 2022-07-07 RX ADMIN — FENTANYL CITRATE 50 MCG: 50 INJECTION, SOLUTION INTRAMUSCULAR; INTRAVENOUS at 09:48

## 2022-07-07 NOTE — OP NOTE
PREOPERATIVE DIAGNOSIS: Right upper eyelid ptosis. Thyroid eye disease.  POSTOPERATIVE DIAGNOSIS:  Right upper eyelid ptosis. Thyroid ye disease.   PROCEDURE:Right upper eyelid ptosis repair by Lee's muscle conjunctival resection.   SURGEON: Mia Hoskins MD  ASSISTANT: Tatum Yuan MD   ANESTHESIA: Monitored with local infiltration of a 50/50 mixture of 2% lidocaine with epinephrine and 0.5% Marcaine.   COMPLICATIONS: None.   ESTIMATED BLOOD LOSS: 1 mL   HISTORY: Maia Woods presented with upper lid ptosis interfering with the superior visual field and activities of daily living. This is in the setting of thyroid eye disease. After the risks, benefits and alternatives to the proposed procedure were explained, informed consent was obtained.   PROCEDURE: The patient was brought to the operating room and placed supine on the operating table. IV sedation was given. The right upper eyelid was infiltrated with local anesthetic and  was prepped and draped in the typical sterile ophthalmic fashion. Atttention was directed to the right  side.  A 4-0 silk suture was placed through the eyelid margin and the eyelid everted over a Desmarres retractor. A 6-0 silk suture was then threaded through the conjunctiva and Lee's muscle 4 mm from the superior tarsal border in order to excise a total of 8 mm of muscle and conjunctiva. These sutures were used to elevate the conjunctiva and Lee's muscle which was gently peeled from the underlying levator muscle. The Putterman clamp was used and clamped over the elevated tissues. A 6-0 plain gut suture was run in a horizontal mattress fashion 1 mm below the clamp from lateral to medial, then medial to lateral. The elevated tissues were excised with a 15 blade. The sutures were then externalized and tied in the lid crease. The 4-0 silk suture was removed. The lid was reverted to its normal position and ophthalmic antibiotic ointment placed in the eye.   The patient  tolerated the procedure well and left the operating room in stable condition.   Mia Hoskins MD

## 2022-07-07 NOTE — ANESTHESIA CARE TRANSFER NOTE
Patient: Maia Woods    Procedure: Procedure(s):  Right upper eyelid ptosis repair       Diagnosis: Thyroid eye disease [E05.00]  Acquired ptosis of right eyelid [H02.401]  Diagnosis Additional Information: No value filed.    Anesthesia Type:   MAC     Note:    Oropharynx: oropharynx clear of all foreign objects and spontaneously breathing  Level of Consciousness: awake  Oxygen Supplementation: room air    Independent Airway: airway patency satisfactory and stable  Dentition: dentition unchanged  Vital Signs Stable: post-procedure vital signs reviewed and stable  Report to RN Given: handoff report given  Patient transferred to: Phase II    Handoff Report: Identifed the Patient, Identified the Reponsible Provider, Reviewed the pertinent medical history, Discussed the surgical course, Reviewed Intra-OP anesthesia mangement and issues during anesthesia, Set expectations for post-procedure period and Allowed opportunity for questions and acknowledgement of understanding      Vitals:  Vitals Value Taken Time   /75    Temp 97.4    Pulse 70    Resp 16    SpO2 100        Electronically Signed By: JORGE Henson CRNA  July 7, 2022  10:14 AM

## 2022-07-07 NOTE — ANESTHESIA POSTPROCEDURE EVALUATION
Patient: Maia Woods    Procedure: Procedure(s):  Right upper eyelid ptosis repair       Anesthesia Type:  MAC    Note:  Disposition: Outpatient   Postop Pain Control: Uneventful            Sign Out: Well controlled pain   PONV: No   Neuro/Psych: Uneventful            Sign Out: Acceptable/Baseline neuro status   Airway/Respiratory: Uneventful            Sign Out: Acceptable/Baseline resp. status   CV/Hemodynamics: Uneventful            Sign Out: Acceptable CV status; No obvious hypovolemia; No obvious fluid overload   Other NRE: NONE   DID A NON-ROUTINE EVENT OCCUR? No           Last vitals:  Vitals Value Taken Time   /66 07/07/22 1030   Temp 36.3  C (97.4  F) 07/07/22 1010   Pulse 64 07/07/22 1030   Resp 16 07/07/22 1030   SpO2 98 % 07/07/22 1030       Electronically Signed By: Mark Mclean MD, MD  July 7, 2022  11:05 AM

## 2022-07-07 NOTE — DISCHARGE INSTRUCTIONS
Post-operative Instructions  Ophthalmic Plastic and Reconstructive Surgery    Mia Hoskins M.D.     All instructions apply to the operated eye(s) or eyelid(s).    Wound care and personal care  ? If a patch or bandage has been placed, please leave this in place until seen by your physician. Ensure that the bandage does not get wet when you take a shower.   ? Apply ice compresses 15 minutes of every hour while awake for 2 days. If you are sleeping, you don't need to wake up to ice. As long as there is no further bleeding, after two days, switch to warm water compresses for five minutes, four times a day until seen by your physician.   ? You may shower or wash your hair the day after surgery. Do not go swimming for at least 2 weeks to prevent contamination of your wounds.  ? You may go for walks and light activity is ok, but no heavy (over 15 pounds) lifting, bending or excessive straining for one week.   ? Do not apply make-up to the eyes or eyelids for 2 weeks after surgery.  ? Expect some swelling, bruising, black eye (even into the lower eyelids and cheeks). Also expect serum caking, crusting and discharge from the eye and/or incisions. A small amount of surface bleeding, and depending on the type of surgery, bleeding from the inside of the eyelid, is normal for the first 48 hours.  ? Avoid straining, bending at the waist, or lifting more than 15 pounds for 1 week. Sleeping with your head elevated, such as in a recliner, for the first several days can help swelling resolve more quickly.   ? Do continue to ambulate (walk) as you normally would - being sedentary after surgery can cause blood clots.   ? Your eye(s) and eyelid(s) may be painful and tender. This is normal after surgery.      Contact information and follow-up  ? Return to the Eye Clinic for a follow-up appointment with your physician as scheduled. If no appointment has been scheduled:   - [facility] eye clinic: [phone] for an  appointment with Dr. Hoskins within 2 to 3 weeks from your date of surgery.      -  [facility] eye clinic: [phone] for an appointment with Dr. Hoskins within 2 to 3 weeks from your date of surgery.     ? For severe pain, bleeding, or loss of vision, call the [facility] at [phone] or [facility] at [phone].     After hours or on weekends and holidays, call [phone] and ask to speak with the ophthalmologist on call.    An on call person can be reached after hours for concerns. The on call doctor should not call in medication refill requests after hours or on weekends, so please plan accordingly. An effort has been made to provide adequate pain medications following every surgery, and refills will not be provided in most instances.     Medications  ? Restart all regular home medications and eye drops. If you take Plavix or Aspirin on a regular basis, wait for 72 hours after your surgery before restarting these in order to decrease the risk of bleeding complications.  ? Avoid aspirin and aspirin-like medications (Motrin, Aleve, Ibuprofen, Olena-Strathmore etc) for 72 hours to reduce the risk of bleeding. You may take Tylenol (acetaminophen) for pain.  ? In addition to your home medications, take the following post-operative medications as prescribed by your physician.    ? Instill eye drops 3 times a day for 10 days.   ? In many cases, postoperative discomfort can be managed with Tylenol alone. If narcotic pain medication was prescribed, take pain pills at prescribed frequency as needed for pain.    ? WARNING: Some pain medications contain acetaminophen. You must not take more than 4,000 mg of acetaminophen per 24-hour period. This is equivalent to 12 tablets of Norco, Percocet or Tylenol #3. If you take other over-the-counter medications containing acetaminophen, you must take the amount of acetaminophen into account and reduce the number of  prescribed pain pills accordingly.  ? Prescribed narcotic medications may make you drowsy. You must not drive a car, operate heavy machinery or drink alcohol while taking them.  ? Prescribed narcotic pain medications may cause constipation and nausea. Take them with some food to prevent a stomach upset.          [facility]  Home Care Following Anesthesia  For 24 hours after surgery:  Get plenty of rest.  A responsible adult must stay with you for at least 24 hours after you leave the surgery center.  Do not drive or use heavy equipment.  If you have weakness or tingling, don't drive or use heavy equipment until this feeling goes away.   Do not drink alcohol.   Avoid strenuous or risky activities.  Ask for help when climbing stairs.  You may feel lightheaded.  IF so, sit for a few minutes before standing.  Have someone help you get up.   If you have nausea (feel sick to your stomach): Drink only clear liquids such as apple juice, ginger ale, broth or 7-Up.  Rest may also help.  Be sure to drink enough fluids.  Move to a regular diet as you feel able.   You may have a slight fever.  Call the doctor if your fever is over 100 F (37.7 C) (taken under the tongue) or lasts longer than 24 hours.  You may have a dry mouth, a sore throat, muscle aches or trouble sleeping. These should go away after 24 hours.  Do not make important or legal decisions.   It is recommended to avoid smoking.               Tips for taking pain medications  To get the best pain relief possible, remember these points:  Take pain medications as directed, before pain becomes severe.  Pain medication can upset your stomach: taking it with food may help.  Constipation is a common side effect of pain medication. Drink plenty of  fluids.  Eat foods high in fiber. Take a stool softener if recommended by your doctor or pharmacist.  Do not drink alcohol, drive or operate machinery while taking pain medications.  Ask  about other ways to control pain, such as with heat, ice or relaxation.    Tylenol/Acetaminophen Consumption  To help encourage the safe use of acetaminophen, the makers of TYLENOL  have lowered the maximum daily dose for single-ingredient Extra Strength TYLENOL  (acetaminophen) products sold in the [place] from 8 pills per day (4,000 mg) to 6 pills per day (3,000 mg). The dosing interval has also changed from 2 pills every 4-6 hours to 2 pills every 6 hours.  If you feel your pain relief is insufficient, you may take Tylenol/Acetaminophen in addition to your narcotic pain medication.   Be careful not to exceed 3,000 mg of Tylenol/Acetaminophen in a 24 hour period from all sources.  If you are taking extra strength Tylenol/acetaminophen (500 mg), the maximum dose is 6 tablets in 24 hours.  If you are taking regular strength acetaminophen (325 mg), the maximum dose is 9 tablets in 24 hours.    Call a doctor for any of the following:  Signs of infection (fever, growing tenderness at the surgery site, a large amount of drainage or bleeding, severe pain, foul-smelling drainage, redness, swelling).  It has been over 8 to 10 hours since surgery and you are still not able to urinate (pass water).  Headache for over 24 hours.  Numbness, tingling or weakness the day after surgery (if you had spinal anesthesia).  Signs of Covid-19 infection (temperature over 100 degrees, shortness of breath, cough, loss of taste/smell, generalized body aches, persistent headache, chills, sore throat, nausea/vomiting/diarrhea)    Your doctor is:       Dr. Mia Hoskins, Ophthalmology: [phone]               Or dial [phone] and ask for the resident on call for:  Ophthalmology  For emergency care, call the:  [facility]:  [phone] (TTY for hearing impaired: [phone])

## 2022-07-07 NOTE — BRIEF OP NOTE
[facility]    Brief Operative Note    Pre-operative diagnosis: Thyroid eye disease [E05.00]  Acquired ptosis of right eyelid [H02.401]  Post-operative diagnosis Same as pre-operative diagnosis    Procedure: Procedure(s):  Right upper eyelid ptosis repair  Surgeon: Surgeon(s) and Role:     * Mia Hoskisn MD - Primary     * Tatum Yuan MD - Resident - Assisting     * Tia Yang MD - Fellow - Assisting  Anesthesia: MAC with Local   Estimated Blood Loss: Minimal    Drains: None  Specimens: * No specimens in log *  Findings:   as expected.  Complications: None.  Implants: * No implants in log *

## 2022-07-07 NOTE — ANESTHESIA PREPROCEDURE EVALUATION
Anesthesia Pre-Procedure Evaluation    Patient: Maia Woods   MRN: [id number] : 1995        Procedure : Procedure(s):  Right upper eyelid ptosis repair          Past Medical History:   Diagnosis Date     Hyperthyroidism     Grave's disease     IBS (irritable bowel syndrome)      Migraine       Past Surgical History:   Procedure Laterality Date     AS RAD RESEC TONSIL/PILLARS       GYN SURGERY      hymenectomy     OPTICAL TRACKING SYSTEM ORBITOTOMY Bilateral 10/14/2021    Procedure: Bilateral lateral orbital decompression with navigation;  Surgeon: Mia Hoskins MD;  Location: [facility] OR     ORBITOTOMY Bilateral      REPAIR PTOSIS Right 3/31/2022    Procedure: REPAIR, PTOSIS RIGHT;  Surgeon: Mia Hoskins MD;  Location: [facility] OR     REPAIR RETRACTION LID Right 10/1/2020    Procedure: Right upper eyelid retraction repair;  Surgeon: Mia Hoskins MD;  Location: [facility] OR     SONOPET EYE Bilateral 10/14/2021    Procedure: SONOPET EYE;  Surgeon: Mia Hoskins MD;  Location: [facility] OR     THYROIDECTOMY N/A 2020    Procedure: TOTAL THYROIDECTOMY;  Surgeon: Mimi Chau MD;  Location: [facility] OR     wisdom teeth extraction        Allergies   Allergen Reactions     Sulfa Drugs Hives      Social History     Tobacco Use     Smoking status: Never Smoker     Smokeless tobacco: Never Used   Substance Use Topics     Alcohol use: Yes     Comment: occas      Wt Readings from Last 1 Encounters:   22 65.8 kg (145 lb)        Anesthesia Evaluation   Pt has had prior anesthetic. Type: General.        ROS/MED HX  ENT/Pulmonary:  - neg pulmonary ROS     Neurologic:  - neg neurologic ROS     Cardiovascular:  - neg cardiovascular ROS     METS/Exercise Tolerance:     Hematologic:  - neg hematologic  ROS     Musculoskeletal:  - neg musculoskeletal ROS     GI/Hepatic:  - neg GI/hepatic ROS     Renal/Genitourinary:  - neg Renal ROS     Endo:     (+) thyroid problem,     Psychiatric/Substance Use:  - neg  psychiatric ROS     Infectious Disease:       Malignancy:       Other:            Physical Exam    Airway  airway exam normal           Respiratory Devices and Support         Dental  no notable dental history         Cardiovascular   cardiovascular exam normal          Pulmonary   pulmonary exam normal                OUTSIDE LABS:  CBC: No results found for: WBC, HGB, HCT, PLT  BMP:   Lab Results   Component Value Date     06/23/2020    POTASSIUM 4.5 06/23/2020    CHLORIDE 102 06/23/2020    CO2 26 06/23/2020    BUN 8 06/23/2020    CR 0.66 06/23/2020     (H) 06/23/2020     COAGS: No results found for: PTT, INR, FIBR  POC:   Lab Results   Component Value Date    HCG Negative 07/07/2022     HEPATIC: No results found for: ALBUMIN, PROTTOTAL, ALT, AST, GGT, ALKPHOS, BILITOTAL, BILIDIRECT, HA  OTHER:   Lab Results   Component Value Date    IRLANDA 8.5 06/23/2020       Anesthesia Plan    ASA Status:  2   NPO Status:  NPO Appropriate    Anesthesia Type: MAC.     - Reason for MAC: straight local not clinically adequate   Induction: Intravenous.   Maintenance: TIVA.        Consents    Anesthesia Plan(s) and associated risks, benefits, and realistic alternatives discussed. Questions answered and patient/representative(s) expressed understanding.     - Discussed: Risks, Benefits and Alternatives for BOTH SEDATION and the PROCEDURE were discussed     - Discussed with:  Patient         Postoperative Care    Pain management: Oral pain medications.   PONV prophylaxis: Ondansetron (or other 5HT-3), Dexamethasone or Solumedrol     Comments:                Mark Mclean MD, MD

## 2022-07-21 ENCOUNTER — OFFICE VISIT (OUTPATIENT)
Dept: OPHTHALMOLOGY | Facility: CLINIC | Age: 27
End: 2022-07-21
Payer: COMMERCIAL

## 2022-07-21 DIAGNOSIS — E07.9 THYROID EYE DISEASE: Primary | ICD-10-CM

## 2022-07-21 DIAGNOSIS — H57.89 THYROID EYE DISEASE: Primary | ICD-10-CM

## 2022-07-21 PROCEDURE — 99024 POSTOP FOLLOW-UP VISIT: CPT | Performed by: OPHTHALMOLOGY

## 2022-07-21 ASSESSMENT — TONOMETRY
IOP_METHOD: ICARE
OD_IOP_MMHG: 16
OS_IOP_MMHG: 17

## 2022-07-21 ASSESSMENT — VISUAL ACUITY
OD_SC: 20/20
OS_SC: 20/20
METHOD: SNELLEN - LINEAR

## 2022-07-21 NOTE — NURSING NOTE
Chief Complaints and History of Present Illnesses   Patient presents with     Follow Up     S/P Right upper eyelid ptosis repair by Lee's muscle conjunctival resection. 7/7/22     Chief Complaint(s) and History of Present Illness(es)     Follow Up     Laterality: right eye    Associated symptoms: Negative for dryness, eye pain, tearing and discharge    Treatments tried: no treatments    Pain scale: 0/10    Comments: S/P Right upper eyelid ptosis repair by Lee's muscle conjunctival resection. 7/7/22              Comments     Patient states that things are going well. Happy with the way surgery went. No pain or discharge issues.   Vision remains stable and no changes with this. Continues to sporadic double vision.     Finished 10 day course of drops as directed.     ADÁN Leach COT 7:27 AM July 21, 2022

## 2022-07-21 NOTE — PROGRESS NOTES
Chief Complaint(s) and History of Present Illness(es)     Follow Up     Laterality: right eye    Associated symptoms: Negative for dryness, eye pain, tearing and   discharge    Treatments tried: no treatments    Pain scale: 0/10    Comments: S/P Right upper eyelid ptosis repair by Lee's muscle   conjunctival resection. 7/7/22              Comments     Patient states that things are going well. Happy with the way surgery   went. No pain or discharge issues.   Vision remains stable and no changes with this. Continues to sporadic   double vision.     Finished 10 day course of drops as directed.     Shahrzad Dami, COT COT 7:27 AM July 21, 2022     Artificial tears  Warm compresses  Very close symmetry, about 1 mm asymmetry, consider Upneeq but I expect it to improve after swelling resolves.  2 month follow up.   Doing well. Happy with outcome.      Attending Physician Attestation: Complete documentation of historical and exam elements from today's encounter can be found in the full encounter summary report (not reduplicated in this progress note). I personally obtained the chief complaint(s) and history of present illness. I confirmed and edited as necessary the review of systems, past medical/surgical history, family history, social history, and examination findings as documented by others; and I examined the patient myself. I personally reviewed the relevant tests, images, and reports as documented above. I formulated and edited as necessary the assessment and plan and discussed the findings and management plan with the patient and family. I personally reviewed the ophthalmic test(s) associated with this encounter, agree with the interpretation(s) as documented by the resident/fellow, and have edited the corresponding report(s) as necessary. Mia Hoskins MD

## 2022-09-17 ENCOUNTER — MYC MEDICAL ADVICE (OUTPATIENT)
Dept: OPHTHALMOLOGY | Facility: CLINIC | Age: 27
End: 2022-09-17

## 2022-09-18 ENCOUNTER — HEALTH MAINTENANCE LETTER (OUTPATIENT)
Age: 27
End: 2022-09-18

## 2022-10-04 ENCOUNTER — OFFICE VISIT (OUTPATIENT)
Dept: OPHTHALMOLOGY | Facility: CLINIC | Age: 27
End: 2022-10-04
Payer: COMMERCIAL

## 2022-10-04 DIAGNOSIS — E07.9 THYROID EYE DISEASE: Primary | ICD-10-CM

## 2022-10-04 DIAGNOSIS — H02.401 PTOSIS, RIGHT EYELID: ICD-10-CM

## 2022-10-04 DIAGNOSIS — H57.89 THYROID EYE DISEASE: Primary | ICD-10-CM

## 2022-10-04 PROCEDURE — 99024 POSTOP FOLLOW-UP VISIT: CPT | Performed by: OPHTHALMOLOGY

## 2022-10-04 ASSESSMENT — VISUAL ACUITY
OD_SC: 20/20
METHOD: SNELLEN - LINEAR
OS_SC: 20/20

## 2022-10-04 ASSESSMENT — CONF VISUAL FIELD
OD_NORMAL: 1
OS_NORMAL: 1

## 2022-10-04 ASSESSMENT — TONOMETRY
OD_IOP_MMHG: 17
OS_IOP_MMHG: 16
IOP_METHOD: ICARE

## 2022-10-04 NOTE — PROGRESS NOTES
Chief Complaint(s) and History of Present Illness(es)     Post Op (Ophthalmology) Right Eye     Laterality: right eye    Associated symptoms: Negative for eye pain, redness and swelling    Comments: Pt here for POM#2 s/p Right upper eyelid ptosis repair   07/07/2022.           Comments     Pt has unchanged vision since last exam. Eye feels good. No concerns.   Pt not using drops.   KATIE SANTAMARIA, JAMEEL 8:09 AM October 4, 2022            Maia Woods is about 2 months status post op right ptosis repair.  She is doing well. Maybe 1 mm asymmetry but improved. She is pleased.  Working on diplopia with Dr. 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    She will follow up with me on an as needed basis.    Complete documentation of historical and exam elements from today's encounter can be found in the full encounter summary report (not reduplicated in this progress note). I personally obtained the chief complaint(s) and history of present illness.  I confirmed and edited as necessary the review of systems, past medical/surgical history, family history, social history, and examination findings as documented by others; and I examined the patient myself. I personally reviewed the relevant tests, images, and reports as documented above. I formulated and edited as necessary the assessment and plan and discussed the findings and management plan with the patient and family. - Mia Hoskins MD

## 2022-10-04 NOTE — NURSING NOTE
Chief Complaints and History of Present Illnesses   Patient presents with     Post Op (Ophthalmology) Right Eye     Pt here for POM#2 s/p Right upper eyelid ptosis repair 07/07/2022.     Chief Complaint(s) and History of Present Illness(es)     Post Op (Ophthalmology) Right Eye     Laterality: right eye    Associated symptoms: Negative for eye pain, redness and swelling    Comments: Pt here for POM#2 s/p Right upper eyelid ptosis repair 07/07/2022.              Comments     Pt has unchanged vision since last exam. Eye feels good. No concerns.   Pt not using drops.   JAMEEL HINKLE 8:09 AM October 4, 2022

## 2022-10-05 ENCOUNTER — OFFICE VISIT (OUTPATIENT)
Dept: OPHTHALMOLOGY | Facility: CLINIC | Age: 27
End: 2022-10-05
Attending: OPHTHALMOLOGY
Payer: COMMERCIAL

## 2022-10-05 DIAGNOSIS — H53.10 SUBJECTIVE VISUAL DISTURBANCE: ICD-10-CM

## 2022-10-05 DIAGNOSIS — H50.21 HYPERTROPIA OF RIGHT EYE: ICD-10-CM

## 2022-10-05 DIAGNOSIS — H53.2 DOUBLE VISION: Primary | ICD-10-CM

## 2022-10-05 PROCEDURE — 92060 SENSORIMOTOR EXAMINATION: CPT | Mod: 26 | Performed by: OPHTHALMOLOGY

## 2022-10-05 PROCEDURE — 92012 INTRM OPH EXAM EST PATIENT: CPT | Mod: GC | Performed by: OPHTHALMOLOGY

## 2022-10-05 PROCEDURE — 92060 SENSORIMOTOR EXAMINATION: CPT | Performed by: OPHTHALMOLOGY

## 2022-10-05 PROCEDURE — G0463 HOSPITAL OUTPT CLINIC VISIT: HCPCS

## 2022-10-05 RX ORDER — GABAPENTIN 300 MG/1
300 CAPSULE ORAL 3 TIMES DAILY
Qty: 90 CAPSULE | Refills: 3 | Status: SHIPPED | OUTPATIENT
Start: 2022-10-05 | End: 2022-10-26 | Stop reason: DRUGHIGH

## 2022-10-05 ASSESSMENT — TONOMETRY
IOP_METHOD: TONOPEN
OD_IOP_MMHG: 18
OS_IOP_MMHG: 18

## 2022-10-05 ASSESSMENT — CUP TO DISC RATIO
OD_RATIO: 0.3
OS_RATIO: 0.4

## 2022-10-05 ASSESSMENT — SLIT LAMP EXAM - LIDS: COMMENTS: NORMAL

## 2022-10-05 ASSESSMENT — CONF VISUAL FIELD
OD_NORMAL: 1
OS_NORMAL: 1
METHOD: COUNTING FINGERS

## 2022-10-05 ASSESSMENT — VISUAL ACUITY
OD_SC: 20/20
METHOD: SNELLEN - LINEAR
OS_SC: 20/20

## 2022-10-05 ASSESSMENT — EXTERNAL EXAM - LEFT EYE: OS_EXAM: NORMAL

## 2022-10-05 NOTE — NURSING NOTE
Chief Complaint(s) and History of Present Illness(es)     Follow Up     In both eyes (LINDSEY). Additional comments: Pt had a right upper eyelid ptosis repair by Lee's muscle conjunctival resection (7/7/22) with Dr. Daniesl.   She reports still having random double vision that occurs daily.  They last for about 10 seconds, blinking or winking helps it go away.  Maia thinks this is happening more frequently.     CRISTINA Morrell 10/5/2022 2:35 PM

## 2022-10-05 NOTE — PROGRESS NOTES
1. 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   No evidence of disease activity today.    2. 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     I do not believe that she has reactivated thyroid eye disease based upon her stable strabismus examination today as well as lack of other clinical features to suggest that diagnosis.  Also her TSI blood test is actually down in May 2022 from the prior value.     Acetylcholine receptor binding antibody negative, unlikely atypical presentation of myasthenia gravis.  She does not have any other clinical features today on exam that strongly implicate this diagnosis.    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  After last visit we trialed observation, but these episodes have become more frequent and bothersome.     The patient today continues to have episodes occurring approximately 15 times daily of binocular diplopia lasting seconds.  She is a very clear historian.  Despite the fact that I do not have definitive findings of ocular neuromyotonia this remains a concern of mine given that this can rarely occur in association with thyroid eye disease.  She is really bothered by her symptoms and is willing to accept the small risk associated with a trial of gabapentin.  We will start her on 300 mg 3 times a day with her escalating from 300 mg once a day up to the full dose as side effects allow.    Interim history and exam with me since last visit 6/29/22:    LINDSEY with bilateral proptosis and eyelid asymmetry status post prior right upper lid retraction repair and bilateral lateral orbital  "decompression 10/14/21.     Right lid ptosis repair on 3/31/22, now also status post re-operation 7/7/22, which went well. She reports that she is happy with her eyelid positioning. No changes in eyelid positioning per patient.     Patient feels that these episodes happened 15-20 times.    Prior to her orbital decompression her baseline vision was normal and clear in all gazes except RUE gaze which provoked diplopia, with vertical images and with some overlap of the images. Around April 2022 she started noticing daily bouts of binocular diplopia and blurriness lasting seconds, which seems to be happening more frequently since last visit. This happens randomly during the day, sometimes seems associated with fluorescent lighting in the office or when changing fixation from far to near, though also occurs while fixating at distance while driving. The diplopia seems to be diagonal every time it occurs, and the diplopia does not seem to fluctuate in terms of the distance or positioning between the 2 images. The diplopia resolves with blinking and straining for a few seconds. No dysphagia.  No muscle weakness in any other muscle groups.      She has not noticed any other changes that would suggest reactivation of thyroid eye disease.  She has not noticed periocular edema or injection.      TSI on 5/31/22 was 4.4 which is down from 4.6  Anti-Ach binding antibody 6/29/22 was negative.     Visit with Dr. Hoskins 12/21/21  \"Overall doing well post bilateral lateral orbital decompression. Kena improved 4.5 mm bilaterally. Incisions healing nicely.   Several weeks after surgery began to notice diplopia in upgaze and right gaze. She is unsure if it was present immediately after surgery as her vision was a bit blurry. She has a small left hypo in upgaze, which was also noticed on preop strab measurements, but also a new larger left hypo on far right gaze.   We discussed this could be post surgical, or it is possible that " "it could indicate active thyroid eye disease.   I recommend observation at this point. Warm compresses, and artificial tears. Sunscreen on incision if in the sun.  F/u with Dr. Zuñiga in about 2 months, then return to see me once ready to address eyelids.\"    Please see epic chart for complete exam   No ocular neuromyotonia seen on exam today         Taco Boyce MD  Ophthalmology, PGY-3    Complete documentation of historical and exam elements from today's encounter can be found in the full encounter summary report (not reduplicated in this progress note).  I personally obtained the chief complaint(s) and history of present illness.  I confirmed and edited as necessary the review of systems, past medical/surgical history, family history, social history, and examination findings as documented by others; and I examined the patient myself.  I personally reviewed the relevant tests, images, and reports as documented above.  I formulated and edited as necessary the assessment and plan and discussed the findings and management plan with the patient and family.  I personally reviewed the ophthalmic test(s) associated with this encounter, agree with the interpretation(s) as documented by the resident/fellow, and have edited the corresponding report(s) as necessary.     John Pual Zuñiga MD          "

## 2022-10-22 ASSESSMENT — EXTERNAL EXAM - RIGHT EYE: OD_EXAM: TRACE PTOSIS

## 2022-10-22 ASSESSMENT — SLIT LAMP EXAM - LIDS: COMMENTS: TRACE PTOSIS

## 2022-10-26 DIAGNOSIS — H53.10 SUBJECTIVE VISUAL DISTURBANCE: Primary | ICD-10-CM

## 2022-10-26 DIAGNOSIS — H53.2 DOUBLE VISION: ICD-10-CM

## 2022-10-26 RX ORDER — GABAPENTIN 300 MG/1
600 CAPSULE ORAL 3 TIMES DAILY
Qty: 180 CAPSULE | Refills: 4 | Status: SHIPPED | OUTPATIENT
Start: 2022-10-26

## 2023-02-22 ENCOUNTER — TELEPHONE (OUTPATIENT)
Dept: OPHTHALMOLOGY | Facility: CLINIC | Age: 28
End: 2023-02-22
Payer: COMMERCIAL

## 2023-02-22 NOTE — TELEPHONE ENCOUNTER
LVM for pt    Informed pt of clinic cancellation 2/23 due to weather. Rescheduled based on Dr. Zuñiga's recommendations - Next available return appointment    Confirmed new date/time. Provided clinic number for call back. Will also reach out via [facility].    Arabella Plascencia on 2/22/2023 at 12:53 PM

## 2023-05-17 ENCOUNTER — OFFICE VISIT (OUTPATIENT)
Dept: OPHTHALMOLOGY | Facility: CLINIC | Age: 28
End: 2023-05-17
Attending: OPHTHALMOLOGY
Payer: COMMERCIAL

## 2023-05-17 DIAGNOSIS — H53.10 SUBJECTIVE VISUAL DISTURBANCE: ICD-10-CM

## 2023-05-17 DIAGNOSIS — H53.2 DOUBLE VISION: ICD-10-CM

## 2023-05-17 DIAGNOSIS — H50.21 HYPERTROPIA OF RIGHT EYE: Primary | ICD-10-CM

## 2023-05-17 PROCEDURE — 92060 SENSORIMOTOR EXAMINATION: CPT | Performed by: OPHTHALMOLOGY

## 2023-05-17 PROCEDURE — 92060 SENSORIMOTOR EXAMINATION: CPT | Mod: 26 | Performed by: OPHTHALMOLOGY

## 2023-05-17 PROCEDURE — 99214 OFFICE O/P EST MOD 30 MIN: CPT | Performed by: OPHTHALMOLOGY

## 2023-05-17 ASSESSMENT — MARGIN REFLEX DISTANCE
OS_MRD1: 5
OD_MRD1: 4

## 2023-05-17 ASSESSMENT — CONF VISUAL FIELD
OS_INFERIOR_NASAL_RESTRICTION: 0
OD_INFERIOR_TEMPORAL_RESTRICTION: 0
OD_NORMAL: 1
OS_SUPERIOR_TEMPORAL_RESTRICTION: 0
OS_NORMAL: 1
OD_INFERIOR_NASAL_RESTRICTION: 0
OS_SUPERIOR_NASAL_RESTRICTION: 0
METHOD: COUNTING FINGERS
OS_INFERIOR_TEMPORAL_RESTRICTION: 0
OD_SUPERIOR_NASAL_RESTRICTION: 0
OD_SUPERIOR_TEMPORAL_RESTRICTION: 0

## 2023-05-17 ASSESSMENT — VISUAL ACUITY
OD_SC: 20/20
METHOD: SNELLEN - LINEAR
OS_SC: 20/20

## 2023-05-17 ASSESSMENT — TONOMETRY
IOP_METHOD: SINGLE KW ICARE
OS_IOP_MMHG: 17
OD_IOP_MMHG: 16

## 2023-05-17 ASSESSMENT — EXTERNAL EXAM - LEFT EYE: OS_EXAM: NORMAL

## 2023-05-17 ASSESSMENT — SLIT LAMP EXAM - LIDS
COMMENTS: NORMAL
COMMENTS: NORMAL

## 2023-05-17 ASSESSMENT — EXTERNAL EXAM - RIGHT EYE: OD_EXAM: NORMAL

## 2023-05-17 NOTE — PROGRESS NOTES
1. 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   No evidence of disease activity today.     2. Recurrent episodes of transient binocular diplopia: Initially I thought this might be ocular neuromyotonia based upon history provided by patient and seemingly normal strabismus exams however ocular neuromyotonia never witnessed AND patient did not respond to gabapentin. Today on exam I found a small angle right hypertropia / left hypotropia on single Snyder reji testing and her fusional amplitudes were quite poor such that 1 prism diopter base up right eye shifted her into binocular diplopia in primary gaze with the same type of diplopia she experiences at home.    Discussed treatment options for her diplopia with patient including observation, ground in prism glasses, small angle strabismus surgery. I recommended ground in prism glasses however this would require her to wear glasses which she does not currently. She opted to observe for now and consider her options.     Historical history and exam 6/29/22:     LINDSYE with bilateral proptosis and eyelid asymmetry status post prior right upper lid retraction repair and bilateral lateral orbital decompression 10/14/21.      Right lid ptosis repair on 3/31/22, now also status post re-operation 7/7/22, which went well. She reports that she is happy with her eyelid positioning. No changes in eyelid positioning per patient.      Patient feels that these episodes happened 15-20 times.     Prior to her orbital decompression her baseline vision was normal and clear in all gazes except RUE gaze which provoked diplopia, with vertical images and with some overlap of the images. Around April 2022 she started noticing daily bouts of binocular diplopia and blurriness lasting seconds, which seems to be happening more  "frequently since last visit. This happens randomly during the day, sometimes seems associated with fluorescent lighting in the office or when changing fixation from far to near, though also occurs while fixating at distance while driving. The diplopia seems to be diagonal every time it occurs, and the diplopia does not seem to fluctuate in terms of the distance or positioning between the 2 images. The diplopia resolves with blinking and straining for a few seconds. No dysphagia.  No muscle weakness in any other muscle groups.       She has not noticed any other changes that would suggest reactivation of thyroid eye disease.  She has not noticed periocular edema or injection.       TSI on 5/31/22 was 4.4 which is down from 4.6  Anti-Ach binding antibody 6/29/22 was negative.      Visit with Dr. Hoskins 12/21/21  \"Overall doing well post bilateral lateral orbital decompression. Kena improved 4.5 mm bilaterally. Incisions healing nicely.   Several weeks after surgery began to notice diplopia in upgaze and right gaze. She is unsure if it was present immediately after surgery as her vision was a bit blurry. She has a small left hypo in upgaze, which was also noticed on preop strab measurements, but also a new larger left hypo on far right gaze.   We discussed this could be post surgical, or it is possible that it could indicate active thyroid eye disease.   I recommend observation at this point. Warm compresses, and artificial tears. Sunscreen on incision if in the sun.  F/u with Dr. Zuñiga in about 2 months, then return to see me once ready to address eyelids.\"     Please see epic chart for complete exam   No ocular neuromyotonia seen on exam today    Interim history since last visit: 10/5/2022    Stopped gabapentin early November 2022 because no improvement in diplopia symptoms. Reports that double vision has slightly improved in frequency and duration, but still occurring nonetheless. Occurring 10 times " daily, each lasting 3-10 seconds. Continues to report seeing two images diagonally. Worsens in bright/fluorescent lights. Double vision is constant in up gaze, occasionally present with horizontal gaze.     Playing in informal softball league with friends, double vision impacts play and doesn't want to end up getting hit with ball from it.     Regarding proptosis and eyelid lag from LINDSEY, feels like it is better and doesn't notice much asymmetry in her appearance, so no concerns at the moment regarding that. Still taking levothyroxine 125 mcg daily, no dosing changes since last visit. Saw endocrinology in 03/2023, reported that thyroid labs were within normal limits.    Exam:  Uncorrected distance visual acuity was 20/20 in the right eye and 20/20 in the left eye. Intraocular pressure was 16 in the right eye and 17 in the left eye using single kw ICare.  Pupils equal and reactive, no APD.  Anterior segment exam unremarkable.     Sensorimotor exam shows full motility.  With single Snyder reji she has a 1-3 prism diopter right hypertropia / left hypotropia in all gaze positions.  This is larger in up gaze and less in down gaze.  She was symptomatic with diplopia in up gaze OR if given 1 prism diopter base up prism right eye.  No diplopia in any gaze position with 1 prism diopter base down right eye.    We discussed in detail the risks, benefits, and alternatives of eye muscle correction surgery including the very rare risk of death or serious morbidity from a general anesthesia complication and the rare risk of severe vision loss in the operative eye(s) secondary to retinal detachment or endophthalmitis.  We discussed more likely sub-optimal outcomes including the unanticipated need for additional strabismus surgery.  Finally the patient was aware that prisms glasses may be required to optimize single vision following surgery.    After a thorough discussion of these risks, the patient decided to consider strabismus  surgery.  The surgical plan would be as follows:     1. Eye muscle correction, left eye.      Graded tenectomy left inferior rectus     Follow-up 1 week after strabismus surgery.    Plan to [facility] at: [facility]  Prism free glasses for adjustment: no glasses for distance       35 minutes were spent on the date of the encounter by me doing chart review, history and exam, documentation, and further activities as noted above    Vu Guerrero MS-4  Medical Student, [employer] [facility] [employer]    Complete documentation of historical and exam elements from today's encounter can be found in the full encounter summary report (not reduplicated in this progress note).  I personally re-obtained the chief complaint(s) and history of present illness.  I confirmed and edited as necessary the review of systems, past medical/surgical history, family history, social history, and examination findings as documented by others; and I examined the patient myself.  I personally reviewed the relevant tests, images, and reports as documented above.  I formulated and edited as necessary the assessment and plan and discussed the findings and management plan with the patient and family     A medical student was involved in the care of the patient. I was present with the medical student who participated in the service and in the documentation of the note. I have  verified the history and personally performed the physical exam and medical decision making. I extensively reviewed and edited when necessary the assessment and plan. I agree with the assessment and plan of care as documented in the note    John Paul Zuñiga MD

## 2023-12-17 ENCOUNTER — HEALTH MAINTENANCE LETTER (OUTPATIENT)
Age: 28
End: 2023-12-17

## 2025-01-12 ENCOUNTER — HEALTH MAINTENANCE LETTER (OUTPATIENT)
Age: 30
End: 2025-01-12

## (undated) DEVICE — PROBE NEUROSIGN MAGSTIM BIPOLAR 3601-00-TE

## (undated) DEVICE — EYE PREP BETADINE 5% SOLUTION 30ML 0065-0411-30

## (undated) DEVICE — GLOVE PROTEXIS MICRO 7.5  2D73PM75

## (undated) DEVICE — SOL WATER IRRIG 500ML BOTTLE 2F7113

## (undated) DEVICE — LINEN TOWEL PACK X5 5464

## (undated) DEVICE — TUBING SMOKE EVAC 3/8"X10' E3645

## (undated) DEVICE — ESU EYE HIGH TEMP 65410-183

## (undated) DEVICE — PACK MINOR EYE CUSTOM ASC

## (undated) DEVICE — SU PLAIN 6-0 G-1DA 18" 770G

## (undated) DEVICE — PACK MAJOR HEAD AND NECK RIDGES

## (undated) DEVICE — SU MONOCRYL 4-0 P-3 18" UND Y494G

## (undated) DEVICE — ESU NDL COLORADO MICRO 3CM STR N103A

## (undated) DEVICE — SOL NACL 0.9% IRRIG 500ML BOTTLE 2F7123

## (undated) DEVICE — TUBING SET ASPIRATION W/EXT SONOPET  LF 5450-850-003

## (undated) DEVICE — DRAIN JACKSON PRATT RESERVOIR 100ML SU130-1305

## (undated) DEVICE — LINEN ORTHO ACL PACK 5447

## (undated) DEVICE — SPONGE RAY-TEC 4X8" 7318

## (undated) DEVICE — BLADE KNIFE SURG 15 371115

## (undated) DEVICE — SUCTION MANIFOLD NEPTUNE 2 SYS 4 PORT 0702-020-000

## (undated) DEVICE — TIP ASPIRATOR PAYNER ANG SONOPET UNV 25KHZ 360D 5450-800-312

## (undated) DEVICE — BAG CLEAR TRASH 1.3M 39X33" P4040C

## (undated) DEVICE — SU VICRYL 2-0 TIE 12X18" J905T

## (undated) DEVICE — LINER SUCTION US ASPIRATOR SONOPET CANISTER LF 5450-850-002

## (undated) DEVICE — ESU GROUND PAD ADULT W/CORD E7507

## (undated) DEVICE — PREFILTER SMOKE EVAC E6330

## (undated) DEVICE — GLOVE PROTEXIS POWDER FREE 6.5 ORTHOPEDIC 2D73ET65

## (undated) DEVICE — MASK PT REGISTRATION FOR STRYKER NAV SYSTEM 6001-386-000

## (undated) DEVICE — SU SILK 3-0 SH 30" K832H

## (undated) DEVICE — SU VICRYL 4-0 TIE 12X18" DYED J103T

## (undated) DEVICE — CATH TRAY FOLEY SURESTEP 16FR DRAIN BAG STATOCK A899916

## (undated) DEVICE — BATTERY STRYKER NAVIGATION SYSTEM 6000-006-000

## (undated) DEVICE — SOL NACL 0.9% IRRIG 1000ML BOTTLE 2F7124

## (undated) DEVICE — SOL NACL 0.9% INJ 1000ML BAG 2B1324X

## (undated) DEVICE — SOL WATER IRRIG 1000ML BOTTLE 2F7114

## (undated) DEVICE — ESU PENCIL W/COATED BLADE E2450H

## (undated) DEVICE — TUBING SMOKE EVAC ATTACHMENT E3590

## (undated) DEVICE — DRAIN JACKSON PRATT 10FR ROUND SU130-1321

## (undated) DEVICE — SU VICRYL 3-0 SH 27" UND J416H

## (undated) DEVICE — TUBING SUCTION 12"X1/4" N612

## (undated) DEVICE — DRSG GAUZE 4X4" 8044

## (undated) DEVICE — ESU PENCIL W/HOLSTER E2350H

## (undated) DEVICE — DRAPE STERI TOWEL SM 1000

## (undated) DEVICE — EYE KNIFE CRESCENT 55DEG 373807

## (undated) DEVICE — SU VICRYL 5-0 P-3 18" UND J493G

## (undated) DEVICE — LINEN FULL SHEET 5511

## (undated) DEVICE — LINEN TOWEL PACK X6 WHITE 5487

## (undated) DEVICE — ESU HANDPIECE THUNDERBEAT ENDOSCOPIC FINE JAW TB-00090F

## (undated) DEVICE — APPLICATORS COTTON TIP 6" X2

## (undated) DEVICE — SUCTION CANISTER MEDIVAC LINER 3000ML W/LID 65651-530

## (undated) DEVICE — LINEN HALF SHEET 5512

## (undated) RX ORDER — GLYCOPYRROLATE 0.2 MG/ML
INJECTION INTRAMUSCULAR; INTRAVENOUS
Status: DISPENSED
Start: 2021-10-14

## (undated) RX ORDER — BUPIVACAINE HYDROCHLORIDE 5 MG/ML
INJECTION, SOLUTION EPIDURAL; INTRACAUDAL
Status: DISPENSED
Start: 2021-10-14

## (undated) RX ORDER — ONDANSETRON 2 MG/ML
INJECTION INTRAMUSCULAR; INTRAVENOUS
Status: DISPENSED
Start: 2022-07-07

## (undated) RX ORDER — FENTANYL CITRATE 50 UG/ML
INJECTION, SOLUTION INTRAMUSCULAR; INTRAVENOUS
Status: DISPENSED
Start: 2021-10-14

## (undated) RX ORDER — KETOROLAC TROMETHAMINE 30 MG/ML
INJECTION, SOLUTION INTRAMUSCULAR; INTRAVENOUS
Status: DISPENSED
Start: 2021-10-14

## (undated) RX ORDER — LIDOCAINE HYDROCHLORIDE 20 MG/ML
INJECTION, SOLUTION EPIDURAL; INFILTRATION; INTRACAUDAL; PERINEURAL
Status: DISPENSED
Start: 2021-10-14

## (undated) RX ORDER — GABAPENTIN 300 MG/1
CAPSULE ORAL
Status: DISPENSED
Start: 2021-10-14

## (undated) RX ORDER — LIDOCAINE HCL/PF 100 MG/5ML
SYRINGE (ML) INJECTION
Status: DISPENSED
Start: 2022-07-07

## (undated) RX ORDER — NEOSTIGMINE METHYLSULFATE 1 MG/ML
VIAL (ML) INJECTION
Status: DISPENSED
Start: 2020-06-22

## (undated) RX ORDER — CEFAZOLIN SODIUM 2 G/50ML
SOLUTION INTRAVENOUS
Status: DISPENSED
Start: 2021-10-14

## (undated) RX ORDER — LIDOCAINE HYDROCHLORIDE 20 MG/ML
INJECTION, SOLUTION EPIDURAL; INFILTRATION; INTRACAUDAL; PERINEURAL
Status: DISPENSED
Start: 2022-03-31

## (undated) RX ORDER — DEXAMETHASONE SODIUM PHOSPHATE 10 MG/ML
INJECTION, SOLUTION INTRAMUSCULAR; INTRAVENOUS
Status: DISPENSED
Start: 2021-10-14

## (undated) RX ORDER — PROPOFOL 10 MG/ML
INJECTION, EMULSION INTRAVENOUS
Status: DISPENSED
Start: 2022-03-31

## (undated) RX ORDER — OXYCODONE HYDROCHLORIDE 5 MG/1
TABLET ORAL
Status: DISPENSED
Start: 2021-10-14

## (undated) RX ORDER — PROPOFOL 10 MG/ML
INJECTION, EMULSION INTRAVENOUS
Status: DISPENSED
Start: 2021-10-14

## (undated) RX ORDER — PROPOFOL 10 MG/ML
INJECTION, EMULSION INTRAVENOUS
Status: DISPENSED
Start: 2022-07-07

## (undated) RX ORDER — FENTANYL CITRATE 50 UG/ML
INJECTION, SOLUTION INTRAMUSCULAR; INTRAVENOUS
Status: DISPENSED
Start: 2020-06-22

## (undated) RX ORDER — FENTANYL CITRATE 50 UG/ML
INJECTION, SOLUTION INTRAMUSCULAR; INTRAVENOUS
Status: DISPENSED
Start: 2022-07-07

## (undated) RX ORDER — SCOLOPAMINE TRANSDERMAL SYSTEM 1 MG/1
PATCH, EXTENDED RELEASE TRANSDERMAL
Status: DISPENSED
Start: 2021-10-14

## (undated) RX ORDER — ACETAMINOPHEN 325 MG/1
TABLET ORAL
Status: DISPENSED
Start: 2021-10-14

## (undated) RX ORDER — ONDANSETRON 2 MG/ML
INJECTION INTRAMUSCULAR; INTRAVENOUS
Status: DISPENSED
Start: 2021-10-14

## (undated) RX ORDER — ACETAMINOPHEN 325 MG/1
TABLET ORAL
Status: DISPENSED
Start: 2020-10-01

## (undated) RX ORDER — HYDROMORPHONE HYDROCHLORIDE 1 MG/ML
INJECTION, SOLUTION INTRAMUSCULAR; INTRAVENOUS; SUBCUTANEOUS
Status: DISPENSED
Start: 2020-06-22

## (undated) RX ORDER — LIDOCAINE HYDROCHLORIDE AND EPINEPHRINE 10; 10 MG/ML; UG/ML
INJECTION, SOLUTION INFILTRATION; PERINEURAL
Status: DISPENSED
Start: 2021-10-14

## (undated) RX ORDER — BUPIVACAINE HYDROCHLORIDE 2.5 MG/ML
INJECTION, SOLUTION EPIDURAL; INFILTRATION; INTRACAUDAL
Status: DISPENSED
Start: 2020-06-22

## (undated) RX ORDER — FENTANYL CITRATE 50 UG/ML
INJECTION, SOLUTION INTRAMUSCULAR; INTRAVENOUS
Status: DISPENSED
Start: 2022-03-31

## (undated) RX ORDER — GLYCOPYRROLATE 0.2 MG/ML
INJECTION INTRAMUSCULAR; INTRAVENOUS
Status: DISPENSED
Start: 2020-06-22

## (undated) RX ORDER — GABAPENTIN 300 MG/1
CAPSULE ORAL
Status: DISPENSED
Start: 2020-10-01

## (undated) RX ORDER — CEFAZOLIN SODIUM 2 G/100ML
INJECTION, SOLUTION INTRAVENOUS
Status: DISPENSED
Start: 2020-06-22

## (undated) RX ORDER — CEFAZOLIN SODIUM 1 G/3ML
INJECTION, POWDER, FOR SOLUTION INTRAMUSCULAR; INTRAVENOUS
Status: DISPENSED
Start: 2020-06-22